# Patient Record
Sex: FEMALE | Race: WHITE | NOT HISPANIC OR LATINO | Employment: PART TIME | URBAN - METROPOLITAN AREA
[De-identification: names, ages, dates, MRNs, and addresses within clinical notes are randomized per-mention and may not be internally consistent; named-entity substitution may affect disease eponyms.]

---

## 2023-03-03 ENCOUNTER — OFFICE VISIT (OUTPATIENT)
Dept: PODIATRY | Facility: CLINIC | Age: 63
End: 2023-03-03

## 2023-03-03 VITALS
SYSTOLIC BLOOD PRESSURE: 110 MMHG | BODY MASS INDEX: 27.88 KG/M2 | DIASTOLIC BLOOD PRESSURE: 76 MMHG | HEIGHT: 60 IN | WEIGHT: 142 LBS

## 2023-03-03 DIAGNOSIS — M19.072 OSTEOARTHRITIS OF LEFT ANKLE OR FOOT: ICD-10-CM

## 2023-03-03 DIAGNOSIS — M79.672 PAIN IN LEFT FOOT: ICD-10-CM

## 2023-03-03 DIAGNOSIS — Q82.8 POROKERATOSIS: Primary | ICD-10-CM

## 2023-03-03 RX ORDER — DICLOFENAC SODIUM 75 MG/1
TABLET, DELAYED RELEASE ORAL
COMMUNITY
Start: 2023-02-08

## 2023-03-03 RX ORDER — METOPROLOL SUCCINATE 50 MG/1
50 TABLET, EXTENDED RELEASE ORAL DAILY
COMMUNITY

## 2023-03-03 RX ORDER — HYDROCODONE BITARTRATE AND ACETAMINOPHEN 5; 325 MG/1; MG/1
1 TABLET ORAL EVERY 6 HOURS PRN
COMMUNITY

## 2023-03-07 NOTE — PROGRESS NOTES
Assessment/Plan:    Porokeratosis  Recommend padding as needed, and paring of benign lesion when it gets thick enough  Pain in left foot  Osteoarthritis of left ankle or foot  Continue with current regimen of Voltaren 75 mg twice a day  Continue with Vicodin at night only if needed when pain gets severe  Continue with current orthotics/arch supports to help limit stress through the midfoot where her severe arthritis is localized  Call if any increased pain/discomfort are noted  Follow-up as needed  Other orders  -           Subjective:      Patient ID: Verena Main is a 57 y o  female  47-year-old white female presents with extremely painful lump on left foot  Patient reports this has previously given her trouble but she was able to pick the lesion out  Secondary concern of continued chronic pain from her left midfoot  Reports she continues to take Voltaren 75 mg a day for the arthritis  The following portions of the patient's history were reviewed and updated as appropriate: allergies, current medications, past family history, past medical history, past social history, past surgical history and problem list     Review of Systems   Constitutional: Negative for chills and fever  HENT: Negative for ear pain and sore throat  Eyes: Negative for pain and visual disturbance  Respiratory: Negative for cough and shortness of breath  Cardiovascular: Negative for chest pain and palpitations  Gastrointestinal: Negative for abdominal pain and vomiting  Genitourinary: Negative for dysuria and hematuria  Musculoskeletal: Negative for arthralgias and back pain  Pain left midfoot   Skin: Negative for color change and rash  Nucleated painful lesion with central keratin/core consistent with porokeratosis   Neurological: Negative for seizures and syncope  All other systems reviewed and are negative          Objective:      /76   Ht 5' (1 524 m)   Wt 64 4 kg (142 lb)   BMI 27 73 kg/m²          Physical Exam  Constitutional:       Appearance: Normal appearance  HENT:      Head: Normocephalic  Right Ear: Tympanic membrane normal       Left Ear: Tympanic membrane normal       Nose: No congestion  Mouth/Throat:      Mouth: Mucous membranes are moist       Pharynx: No oropharyngeal exudate or posterior oropharyngeal erythema  Eyes:      Extraocular Movements: Extraocular movements intact  Conjunctiva/sclera: Conjunctivae normal       Pupils: Pupils are equal, round, and reactive to light  Cardiovascular:      Rate and Rhythm: Normal rate and regular rhythm  Pulses:           Dorsalis pedis pulses are 1+ on the right side and 1+ on the left side  Posterior tibial pulses are 1+ on the left side  Pulmonary:      Effort: Pulmonary effort is normal    Abdominal:      Palpations: Abdomen is soft  Musculoskeletal:         General: Swelling and tenderness (Pain localized to the fourth/fifth metatarsal cuboid joint ) present  Left foot: Decreased range of motion  Comments: Generalized swelling through left midfoot with depressed medial longitudinal arch and diminished range of motion  Feet:      Right foot:      Protective Sensation: 10 sites tested  9 sites sensed  Skin integrity: Dry skin present  Left foot:      Protective Sensation: 10 sites tested  9 sites sensed  Skin integrity: Dry skin present  Skin:     General: Skin is warm and dry  Capillary Refill: Capillary refill takes 2 to 3 seconds  Coloration: Skin is not pale  Findings: No bruising, erythema, lesion or rash  Comments: Nucleated porokeratosis 0 2 cm² plantar midfoot, moderate pain with palpation   Neurological:      General: No focal deficit present  Mental Status: She is alert  Cranial Nerves: No cranial nerve deficit  Sensory: No sensory deficit  Motor: No weakness        Gait: Gait normal       Deep Tendon Reflexes: Reflexes normal    Psychiatric:         Mood and Affect: Mood normal          Behavior: Behavior normal          Judgment: Judgment normal

## 2023-04-07 ENCOUNTER — APPOINTMENT (OUTPATIENT)
Dept: RADIOLOGY | Facility: CLINIC | Age: 63
End: 2023-04-07

## 2023-04-07 ENCOUNTER — OFFICE VISIT (OUTPATIENT)
Dept: OBGYN CLINIC | Facility: CLINIC | Age: 63
End: 2023-04-07

## 2023-04-07 VITALS
SYSTOLIC BLOOD PRESSURE: 138 MMHG | WEIGHT: 142 LBS | HEIGHT: 60 IN | DIASTOLIC BLOOD PRESSURE: 88 MMHG | BODY MASS INDEX: 27.88 KG/M2

## 2023-04-07 DIAGNOSIS — M19.279 OSTEOARTHRITIS OF MIDFOOT DUE TO INFLAMMATORY ARTHRITIS: Primary | ICD-10-CM

## 2023-04-07 DIAGNOSIS — M25.572 PAIN, JOINT, ANKLE AND FOOT, LEFT: ICD-10-CM

## 2023-04-07 DIAGNOSIS — M19.90 OSTEOARTHRITIS OF MIDFOOT DUE TO INFLAMMATORY ARTHRITIS: Primary | ICD-10-CM

## 2023-04-07 NOTE — PROGRESS NOTES
CHELO Montgomery  Attending, Orthopaedic Surgery  [facility] [address] [facility]      ORTHOPAEDIC FOOT AND ANKLE CLINIC VISIT     Assessment:     Encounter Diagnoses   Name Primary? 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 Yes            Plan:   · The patient verbalized understanding of exam findings and treatment plan  We engaged in the shared decision-making process and treatment options were discussed at length with the patient  Surgical and conservative management discussed today along with risks and benefits  · She has severe arthritis in her midfoot (2-5th TMT joints and naviculocuneiform joints) with collapse of her arch  · Surgery for this would be significant  It would require a major reconstructive fusion  She wishes to hold off on this for now  · Carbon fiber foot plate  · Injections under fluoroscopy   · Supportive shoes  Return in about 3 months (around 7/7/2023)  May require TMT and NC joint arthrodesis      History of Present Illness:   Chief Complaint:   Chief Complaint   Patient presents with   • Left Ankle - Pain     Quirino Augustine is a 57 y o  female who is being seen for left foot pain  Patient states she complains of a collapsing arch that started about 5 years ago  She has been seen by Dr Nohemy Mendes in the past for previous management of her foot pain and plantar keratosis  Pain is localized at medial ankle and plantar aspect with minimal radiating and described as sharp and severe  Patient denies numbness, tingling or radicular pain  Denies history of neuropathy  Patient does not smoke, does not have diabetes and does not take blood thinners  Patient denies family history of anesthesia complications and has not had any complications with anesthesia       Pain/symptom timing:  Worse during the day when active  Pain/symptom context:  Worse with activites and work  Pain/symptom modifying factors:  Rest makes better, activities make worse  Pain/symptom associated signs/symptoms: none    Prior treatment   · NSAIDsYes   · Injections No   · Bracing/Orthotics No    · Physical Therapy No     Orthopedic Surgical History:   See below    Past Medical, Surgical and Social History:  Past Medical History:  has a past medical history of Celiac disease  Problem List: does not have a problem list on file  Past Surgical History:  has a past surgical history that includes Knee surgery (Right, 2015) and Knee Arthroplasty (Left, 2014)  Family History: family history includes Diabetes in her father; Heart attack in her father; Hypertension in her father and mother; Throat cancer in her mother  Social History:  reports that she has never smoked  She has never used smokeless tobacco  No history on file for alcohol use and drug use  Current Medications: has a current medication list which includes the following prescription(s): diclofenac, hydrocodone-acetaminophen, and metoprolol succinate  Allergies: is allergic to acyclovir, demerol [meperidine], percocet [oxycodone-acetaminophen], and tetracycline  Review of Systems:  General- denies fever/chills  HEENT- denies hearing loss or sore throat  Eyes- denies eye pain or visual disturbances, denies red eyes  Respiratory- denies cough or SOB  Cardio- denies chest pain or palpitations  GI- denies abdominal pain  Endocrine- denies urinary frequency  Urinary- denies pain with urination  Musculoskeletal- Negative except noted above  Skin- denies rashes or wounds  Neurological- denies dizziness or headache  Psychiatric- denies anxiety or difficulty concentrating    Physical Exam:   /88 (BP Location: Left arm, Patient Position: Sitting, Cuff Size: Adult)   Ht 5' (1 524 m)   Wt 64 4 kg (142 lb)   BMI 27 73 kg/m²   General/Constitutional: No apparent distress: well-nourished and well developed    Eyes: normal ocular motion  Cardio: RRR, Normal S1S2, No m/r/g  Lymphatic: No appreciable lymphadenopathy  Respiratory: Non-labored breathing, CTA b/l no w/c/r  Vascular: No edema, swelling or tenderness, except as noted in detailed exam   Integumentary: No impressive skin lesions present, except as noted in detailed exam   Neuro: No ataxia or tremors noted  Psych: Normal mood and affect, oriented to person, place and time  Appropriate affect  Musculoskeletal: Normal, except as noted in detailed exam and in HPI  Examination    Left    Gait Normal   Musculoskeletal Tender to palpation at medial ankle    Skin Normal       Nails Normal    Range of Motion  15 degrees dorsiflexion, 30 degrees plantarflexion  Subtalar motion: normal    Stability Stable    Muscle Strength 5/5 tibialis anterior  5/5 gastrocnemius-soleus  5/5 posterior tibialis  5/5 peroneal/eversion strength  5/5 EHL  5/5 FHL    Neurologic Normal    Sensation Intact to light touch throughout sural, saphenous, superficial peroneal, deep peroneal and medial/lateral plantar nerve distributions  [place] 5 07 filament (10g) testing  deferred  Cardiovascular Brisk capillary refill < 2 seconds,intact DP and PT pulses    Special Tests None      Imaging Studies:   3 views of the left foot were taken, reviewed and interpreted independently that demonstrate 2-5th TMT joint arthritis, NC joint arthritis  Reviewed by me personally  Donney Skeens Lachman, MD  Foot & Ankle Surgery   Department of [address] [place] [address]      I personally performed the service  Donney Skeens Lachman, MD

## 2023-04-07 NOTE — PATIENT INSTRUCTIONS
"You have 2nd-4th Tarsometatarsal joint arthritis as well as naviculocuneiform in your left foot  This is a wearing away of the cartilage in your foot leading to bone against bone which causes inflammation which causes pain  The nonoperative treatment for this involves supportive shoes, a carbon fiber foot plate and injections under fluoroscopic guidance  You may schedule your injection with our musculoskeletal radiology team at your earliest convenience  Go to [employer] and type in \"Full length carbon fiber foot plate  \" Pick an item that looks like the one below  Make sure it is the appropriate size for your foot (match your shoe size)  This insert goes under the orthotic in your shoe to stiffen the shoe  If the injections and carbon fiber foot plate are not effective, you will be a candidate for a midfoot fusion surgery  Midfoot Fusion     What is the midfoot? The midfoot refers to the bones and joints that make up the arch and connect the forefoot (which includes the bones of the toes) to the hindfoot (which includes the ankle bone and the heel bone)  What is a midfoot fusion? Midfoot fusion is a procedure in which the separate bones that make up the arch of the foot are fused into a single mass of bone  Fusion is also referred to as arthrodesis  Fusion eliminates the normal motion that occurs between two bones  At left, this standing X-ray view of a right foot shows a gap between the first and second metatarsals (arrow)  At right, this side X-ray view of the same foot shows loss of alignment  In a healthy foot, the two yellow lines would overlap one another  Midfoot fusion can involve all of the midfoot joints  More commonly just one or a few of the joints are fused  The joints of the midfoot do not bend and move like your knee or elbow  They are designed to be relatively stiff to give your foot strength and support your body   Midfoot fusion does not generally produce much " noticeable loss of motion because there is fairly little motion to begin with  What are the goals of a midfoot fusion? The primary goal of midfoot fusion is to decrease pain and improve function  Eliminating the painful motion between arthritic joint surfaces and restoring the bones to their normal positions achieves this  Other goals include the correction of deformity and the return of stability to the arch of the foot  A successful midfoot fusion can achieve these goals and restore more normal walking ability  What signs indicate a midfoot fusion may be needed? The most common reason for midfoot fusion is painful arthritis in the midfoot joints that has not improved with nonsurgical treatment  Other common reasons to do a midfoot fusion include too much motion of one or more of the midfoot joints or deformity of the midfoot  Examples of conditions that may result in midfoot deformity include severe bunions and flatfoot deformity  Midfoot fusion is also indicated for certain acute fractures and joint displacement involving the midfoot  When should I avoid surgery? Midfoot fusion should not be performed if there is active infection or if the patient’s health is poor enough that the risk of surgery is too high  Conditions such as uncontrolled diabetes and blood flow problems may make a patient a poor candidate for surgery  Other reasons to not perform midfoot fusion include osteoporosis and poor skin quality  Smoking significantly increases the risk that bones will not fuse  General Details of Procedure  Successful midfoot fusion depends on complete removal of all joint surfaces (cartilage) and stable fixation of the joints being fused  Residual cartilage can prevent the bones from fusing together  Failure to achieve adequate stability may allow too           Post-surgery X-rays showing correction   The arrow shows that the gap has been eliminated, and a single line can be drawn through the middle of the foot bones, which means the foot is now in the proper position  Screws and plates were used for the repair  much motion for fusion to occur  Specific Techniques  Midfoot fusion is generally accomplished using one or two incisions on the top of the foot  The length of the incision and how many incisions are necessary is determined by the number of joints to be fused  Careful attention is paid to protecting tendons and nerves  Stability is achieved during midfoot fusion using metal implants such as screws and plates  These are designed to immobilize the joints and allow for the formation of bone across the joint space  This process may involve the addition of bone graft material to fill any gaps that might exist between the bones after the cartilage has been removed  This bone graft material may be taken from another location in the patient’s body (autograft)  It may also come from donated bone (allograft) or from a synthetic material  A combination of these materials may be used  What happens after surgery? After surgery a period of protection and immobilization is required for successful fusion to occur  A cast is typically placed for the first six to 10 weeks  Weightbearing is not allowed on the affected foot for eight to 12 weeks after surgery  X-rays are usually obtained every four weeks to assess progress of the fusion  Gradually increased weightbearing is allowed as healing progresses  Initial weightbearing is protected in a prefabricated boot with gradual transition to supportive shoes  Physical therapy may be prescribed on a case-by-case basis to help the patient’s walking and balance  Potential Complications  There are complications that relate to surgery in general  These include risks associated with anesthesia, infection, damage to nerves and blood vessels, and bleeding or blood clots       A major potential complication after midfoot fusion is failure of the bones to fuse (nonunion)  Other complications can include over-correction or under-correction of deformity (malunion)  There can be problems with wound healing  Prominent plates and screws can be painful and may require removal of the hardware  Injury to nerves on the top of the foot can occur  Smoking is one of the leading risks for nonunion  Premature weightbearing can also result in failure of the bones to fuse  Frequently Asked Questions  How much motion in my foot will I lose after midfoot fusion? Motion of the midfoot joints is normally somewhat limited  Loss of that motion after fusion surgery tends to be well tolerated by patients  The more mobile joints of the ankle, hindfoot and forefoot are unaffected by midfoot fusion and thus continue to provide motion to the foot  Will I set off an airport metal detector after midfoot fusion? The strength of the metal detector and the amount of metal implants used determine whether hardware from a midfoot fusion will be detected  It is uncommon for the metal implants to be detectable by airport screening methods  How will I get around after surgery before I am allowed to put any weight on the foot? A combination of devices can be used, including crutches, walkers, knee-rollers, scooters and wheelchairs  Physical therapy is also used to help assess patient needs and improve mobility and safety  Certain patients may benefit from the assistance provided by a skilled nursing facility or post-operative rehabilitation unit  Will the plates and screws have to be removed after midfoot fusion? Metal implants used for midfoot fusion are not routinely removed  Hardware may need to be removed if there is a failure of the fusion or if infection develops  Painful hardware can be removed once the fusion is healed        [employer], [employer], [employer] ([employer] or [employer] is what I recommend)are good brands but I recommend going to a dedicate shoe store (not [employer] or [employer] ) At these types of stores, they have experts that can fit you for shoes appropriate for your foot problem  Shoe choice is essential to solving/improving most types of foot pain  Even after a surgery, good shoes are necessary to keep the foot as comfortable as possible  [employer]  [address] [facility] [address], [address] [address]  [facility], [address]  [phone]    [employer] [address] [facility], [address] [place], [address]  [address] [facility]  [address], [address] [facility]    [employer]  [address] [facility], [address] [facility]  [phone]    [address], [facility], [place] [address]  [phone]    [employer]  [address] [facility] [place], [place], [address]    [address],[place] [address]  [address] [facility], [address]  [address] [facility], [address] [facility], [address] [place] [address]   [phone]

## 2023-04-07 NOTE — LETTER
April 7, 2023     Katherine Stephenson, [address] [facility] [address] [facility]    Patient: Brooklynn Solis   YOB: 1960   Date of Visit: 4/7/2023       Dear Dr Singh Res:    Thank you for referring Brooklynn Solis to me for evaluation  Below are my notes for this consultation  If you have questions, please do not hesitate to call me  I look forward to following your patient along with you  Sincerely,        Ralph Cha MD        CC: No Recipients  Ralph Cha MD  4/7/2023 10:34 AM  Signed        CHELO Lopes  Attending, Orthopaedic Surgery  Foot and [address] [facility] [address] [facility]      ORTHOPAEDIC FOOT AND ANKLE CLINIC VISIT     Assessment:     Encounter Diagnoses   Name Primary? 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 Yes            Plan:   · The patient verbalized understanding of exam findings and treatment plan  We engaged in the shared decision-making process and treatment options were discussed at length with the patient  Surgical and conservative management discussed today along with risks and benefits  · She has severe arthritis in her midfoot (2-5th TMT joints and naviculocuneiform joints) with collapse of her arch  · Surgery for this would be significant  It would require a major reconstructive fusion  She wishes to hold off on this for now  · Carbon fiber foot plate  · Injections under fluoroscopy   · Supportive shoes  Return in about 3 months (around 7/7/2023)  May require TMT and NC joint arthrodesis      History of Present Illness:   Chief Complaint:   Chief Complaint   Patient presents with   • Left Ankle - Pain     Brooklynn Solis is a 57 y o  female who is being seen for left foot pain  Patient states she complains of a collapsing arch that started about 5 years ago  She has been seen by Dr Marielena Llanos in the past for previous management of her foot pain and plantar keratosis    Pain is localized at medial ankle and plantar aspect with minimal radiating and described as sharp and severe  Patient denies numbness, tingling or radicular pain  Denies history of neuropathy  Patient does not smoke, does not have diabetes and does not take blood thinners  Patient denies family history of anesthesia complications and has not had any complications with anesthesia  Pain/symptom timing:  Worse during the day when active  Pain/symptom context:  Worse with activites and work  Pain/symptom modifying factors:  Rest makes better, activities make worse  Pain/symptom associated signs/symptoms: none    Prior treatment   · NSAIDsYes   · Injections No   · Bracing/Orthotics No    · Physical Therapy No     Orthopedic Surgical History:   See below    Past Medical, Surgical and Social History:  Past Medical History:  has a past medical history of Celiac disease  Problem List: does not have a problem list on file  Past Surgical History:  has a past surgical history that includes Knee surgery (Right, 2015) and Knee Arthroplasty (Left, 2014)  Family History: family history includes Diabetes in her father; Heart attack in her father; Hypertension in her father and mother; Throat cancer in her mother  Social History:  reports that she has never smoked  She has never used smokeless tobacco  No history on file for alcohol use and drug use  Current Medications: has a current medication list which includes the following prescription(s): diclofenac, hydrocodone-acetaminophen, and metoprolol succinate  Allergies: is allergic to acyclovir, demerol [meperidine], percocet [oxycodone-acetaminophen], and tetracycline      Review of Systems:  General- denies fever/chills  HEENT- denies hearing loss or sore throat  Eyes- denies eye pain or visual disturbances, denies red eyes  Respiratory- denies cough or SOB  Cardio- denies chest pain or palpitations  GI- denies abdominal pain  Endocrine- denies urinary frequency  Urinary- denies pain with urination  Musculoskeletal- Negative except noted above  Skin- denies rashes or wounds  Neurological- denies dizziness or headache  Psychiatric- denies anxiety or difficulty concentrating    Physical Exam:   /88 (BP Location: Left arm, Patient Position: Sitting, Cuff Size: Adult)   Ht 5' (1 524 m)   Wt 64 4 kg (142 lb)   BMI 27 73 kg/m²   General/Constitutional: No apparent distress: well-nourished and well developed  Eyes: normal ocular motion  Cardio: RRR, Normal S1S2, No m/r/g  Lymphatic: No appreciable lymphadenopathy  Respiratory: Non-labored breathing, CTA b/l no w/c/r  Vascular: No edema, swelling or tenderness, except as noted in detailed exam   Integumentary: No impressive skin lesions present, except as noted in detailed exam   Neuro: No ataxia or tremors noted  Psych: Normal mood and affect, oriented to person, place and time  Appropriate affect  Musculoskeletal: Normal, except as noted in detailed exam and in HPI  Examination    Left    Gait Normal   Musculoskeletal Tender to palpation at medial ankle    Skin Normal       Nails Normal    Range of Motion  15 degrees dorsiflexion, 30 degrees plantarflexion  Subtalar motion: normal    Stability Stable    Muscle Strength 5/5 tibialis anterior  5/5 gastrocnemius-soleus  5/5 posterior tibialis  5/5 peroneal/eversion strength  5/5 EHL  5/5 FHL    Neurologic Normal    Sensation Intact to light touch throughout sural, saphenous, superficial peroneal, deep peroneal and medial/lateral plantar nerve distributions  [place]ango 5 07 filament (10g) testing  deferred  Cardiovascular Brisk capillary refill < 2 seconds,intact DP and PT pulses    Special Tests None      Imaging Studies:   3 views of the left foot were taken, reviewed and interpreted independently that demonstrate 2-5th TMT joint arthritis, NC joint arthritis  Reviewed by me personally  Reuben Running Lachman, MD  Foot & Ankle Surgery   Department of Orthopaedic [address] [facility]      I personally performed the service  Julianne Ray Lachman, MD

## 2023-04-25 ENCOUNTER — HOSPITAL ENCOUNTER (OUTPATIENT)
Dept: RADIOLOGY | Facility: HOSPITAL | Age: 63
Discharge: HOME | End: 2023-04-25
Attending: ORTHOPAEDIC SURGERY

## 2023-04-25 DIAGNOSIS — M19.90 OSTEOARTHRITIS OF MIDFOOT DUE TO INFLAMMATORY ARTHRITIS: ICD-10-CM

## 2023-04-25 DIAGNOSIS — M19.279 OSTEOARTHRITIS OF MIDFOOT DUE TO INFLAMMATORY ARTHRITIS: ICD-10-CM

## 2023-04-25 RX ORDER — BUPIVACAINE HYDROCHLORIDE 2.5 MG/ML
5 INJECTION, SOLUTION EPIDURAL; INFILTRATION; INTRACAUDAL
Status: COMPLETED | OUTPATIENT
Start: 2023-04-25 | End: 2023-04-25

## 2023-04-25 RX ORDER — LIDOCAINE HYDROCHLORIDE 10 MG/ML
5 INJECTION, SOLUTION EPIDURAL; INFILTRATION; INTRACAUDAL; PERINEURAL
Status: COMPLETED | OUTPATIENT
Start: 2023-04-25 | End: 2023-04-25

## 2023-04-25 RX ORDER — BETAMETHASONE SODIUM PHOSPHATE AND BETAMETHASONE ACETATE 3; 3 MG/ML; MG/ML
18 INJECTION, SUSPENSION INTRA-ARTICULAR; INTRALESIONAL; INTRAMUSCULAR; SOFT TISSUE ONCE
Status: COMPLETED | OUTPATIENT
Start: 2023-04-25 | End: 2023-04-25

## 2023-04-25 RX ADMIN — IOHEXOL 1 ML: 300 INJECTION, SOLUTION INTRAVENOUS at 15:42

## 2023-04-25 RX ADMIN — LIDOCAINE HYDROCHLORIDE 3 ML: 10 INJECTION, SOLUTION EPIDURAL; INFILTRATION; INTRACAUDAL; PERINEURAL at 15:43

## 2023-04-25 RX ADMIN — BUPIVACAINE HYDROCHLORIDE 0.5 ML: 2.5 INJECTION, SOLUTION EPIDURAL; INFILTRATION; INTRACAUDAL; PERINEURAL at 15:43

## 2023-04-25 RX ADMIN — BETAMETHASONE SODIUM PHOSPHATE AND BETAMETHASONE ACETATE 6 MG: 3; 3 INJECTION, SUSPENSION INTRA-ARTICULAR; INTRALESIONAL; INTRAMUSCULAR at 15:42

## 2023-06-23 ENCOUNTER — OFFICE VISIT (OUTPATIENT)
Dept: PODIATRY | Facility: CLINIC | Age: 63
End: 2023-06-23
Payer: COMMERCIAL

## 2023-06-23 VITALS
DIASTOLIC BLOOD PRESSURE: 78 MMHG | SYSTOLIC BLOOD PRESSURE: 123 MMHG | HEART RATE: 64 BPM | HEIGHT: 60 IN | BODY MASS INDEX: 27.88 KG/M2 | WEIGHT: 142 LBS

## 2023-06-23 DIAGNOSIS — M19.072 OSTEOARTHRITIS OF LEFT ANKLE OR FOOT: Primary | ICD-10-CM

## 2023-06-23 DIAGNOSIS — M79.672 PAIN IN LEFT FOOT: ICD-10-CM

## 2023-06-23 DIAGNOSIS — Q82.8 POROKERATOSIS: ICD-10-CM

## 2023-06-23 PROCEDURE — 99213 OFFICE O/P EST LOW 20 MIN: CPT | Performed by: PODIATRIST

## 2023-06-23 RX ORDER — HYDROCODONE BITARTRATE AND ACETAMINOPHEN 5; 325 MG/1; MG/1
1 TABLET ORAL EVERY 8 HOURS PRN
Qty: 90 TABLET | Refills: 0 | Status: SHIPPED | OUTPATIENT
Start: 2023-06-23 | End: 2023-07-23

## 2023-06-23 NOTE — PROGRESS NOTES
Assessment/Plan:   Discussed with patient possibility of having an additional corticosteroid injection at next visit if things do not improve  4/7/2023 radiographs reviewed which show severe degenerative arthritis throughout the metatarsal cuneiform joints and the cuneiform navicular joints  May also consider referral to pain management and/or fabrication of new orthotics  Follow-up 6 weeks   Recommend patient refrain from using Norco unless absolutely necessary in the evening  Diagnoses and all orders for this visit:    Osteoarthritis of left ankle or foot  -     HYDROcodone-acetaminophen (NORCO) 5-325 mg per tablet; Take 1 tablet by mouth every 8 (eight) hours as needed for pain Max Daily Amount: 3 tablets    Porokeratosis    Pain in left foot  -     HYDROcodone-acetaminophen (NORCO) 5-325 mg per tablet; Take 1 tablet by mouth every 8 (eight) hours as needed for pain Max Daily Amount: 3 tablets          Subjective:     Patient ID: Rubi Armstrong is a 57 y o  female  61-year-old female presents concerned with continued breakthrough pain of her left midfoot  Patient reports that she continues to take Voltaren twice a day does seem to help throughout the day but at night when it is time to sleep she has aching pain that does not let up  Pain is localized to the same left midfoot area  Chronic lesion on the plantar aspect of the left foot remains unchanged  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       Review of Systems   Constitutional: Negative  HENT: Negative  Eyes: Negative  Respiratory: Negative  Cardiovascular: Negative  Gastrointestinal: Negative  Endocrine: Negative  Genitourinary: Negative  Musculoskeletal: Positive for arthralgias (Left midfoot as noted previously) and gait problem  Skin:        Porokeratotic lesion left foot unchanged   Allergic/Immunologic: Negative  Hematological: Negative  Psychiatric/Behavioral: Negative  Objective:     Physical Exam  Constitutional:       Appearance: Normal appearance  HENT:      Head: Normocephalic  Right Ear: External ear normal       Left Ear: External ear normal    Eyes:      Pupils: Pupils are equal, round, and reactive to light  Cardiovascular:      Rate and Rhythm: Normal rate  Pulses: Normal pulses  Pulmonary:      Effort: Pulmonary effort is normal    Musculoskeletal:         General: Swelling and tenderness present  Cervical back: Normal range of motion  Left foot: Decreased range of motion  Comments: Moderate pain with palpation left midfoot 4/5 met cuboid joint region with localized edema noted  Decreased range of motion left midfoot   Feet:      Right foot:      Protective Sensation: 10 sites tested  10 sites sensed  Skin integrity: Skin integrity normal       Left foot:      Protective Sensation: 10 sites tested  10 sites sensed  Skin integrity: Skin integrity normal    Skin:     General: Skin is warm and dry  Capillary Refill: Capillary refill takes 2 to 3 seconds  Comments: Plantar lateral midfoot porokeratotic lesion noted 0 5 cm² in size  Somewhat diminished in thickness and discomfort compared to last evaluation  Neurological:      General: No focal deficit present  Mental Status: She is alert     Psychiatric:         Mood and Affect: Mood normal

## 2023-07-07 ENCOUNTER — OFFICE VISIT (OUTPATIENT)
Dept: OBGYN CLINIC | Facility: CLINIC | Age: 63
End: 2023-07-07
Payer: COMMERCIAL

## 2023-07-07 VITALS — HEIGHT: 60 IN | BODY MASS INDEX: 27.88 KG/M2 | WEIGHT: 142 LBS

## 2023-07-07 DIAGNOSIS — M19.279 OSTEOARTHRITIS OF MIDFOOT DUE TO INFLAMMATORY ARTHRITIS: Primary | ICD-10-CM

## 2023-07-07 DIAGNOSIS — M19.90 OSTEOARTHRITIS OF MIDFOOT DUE TO INFLAMMATORY ARTHRITIS: Primary | ICD-10-CM

## 2023-07-07 PROCEDURE — 99213 OFFICE O/P EST LOW 20 MIN: CPT | Performed by: ORTHOPAEDIC SURGERY

## 2023-07-07 NOTE — PROGRESS NOTES
Polly Gregg M.D. Attending, Orthopaedic Surgery  Foot and [address] [facility]      ORTHOPAEDIC FOOT AND ANKLE CLINIC VISIT     Assessment:     Encounter Diagnosis   Name Primary? 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 We engaged in the shared decision-making process and treatment options were discussed at length with the patient. Surgical and conservative management discussed today along with risks and benefits. Patient is a f/u for Left 2-5th TMT and naviculocuneiform joint arthritis  She got ~2 months of relief from her fl guided inj on 4/25  Continue stiff sole supportive shoe wear   Carbon fiber foot plate   FL guided inj ordered  She is a candidate for a midfoot arthrodesis. She would like to hold for now. Return if symptoms worsen or fail to improve. History of Present Illness:   Chief Complaint:   Chief Complaint   Patient presents with   • Left Foot - Follow-up     Sowmya Gold is a 57 y.o. female who is being seen in follow-up for left midfoot pain. When we last saw she we recommended fl guided inj. Pain has  improved. Residual pain is localized at dorsal midfoot with minimal radiating and described as sharp and severe. Pain/symptom timing:  Worse during the day when active  Pain/symptom context:  Worse with activites and work  Pain/symptom modifying factors:  Rest makes better, activities make worse  Pain/symptom associated signs/symptoms: none    Prior treatment   · NSAIDsYes   · Injections Yes   · Bracing/Orthotics No    · Physical Therapy No     Orthopedic Surgical History:   See below    Past Medical, Surgical and Social History:  Past Medical History:  has a past medical history of Celiac disease. Problem List: does not have a problem list on file.   Past Surgical History:  has a past surgical history that includes Knee surgery (Right, 2015); Knee Arthroplasty (Left, 2014); and FL guided needle plac bx/asp/inj (4/25/2023). Family History: family history includes Diabetes in her father; Heart attack in her father; Hypertension in her father and mother; Throat cancer in her mother. Social History:  reports that she has never smoked. She has never used smokeless tobacco. No history on file for alcohol use and drug use. Current Medications: has a current medication list which includes the following prescription(s): diclofenac, hydrocodone-acetaminophen, and metoprolol succinate. Allergies: is allergic to acyclovir, demerol [meperidine], percocet [oxycodone-acetaminophen], and tetracycline. Review of Systems:  General- denies fever/chills  HEENT- denies hearing loss or sore throat  Eyes- denies eye pain or visual disturbances, denies red eyes  Respiratory- denies cough or SOB  Cardio- denies chest pain or palpitations  GI- denies abdominal pain  Endocrine- denies urinary frequency  Urinary- denies pain with urination  Musculoskeletal- Negative except noted above  Skin- denies rashes or wounds  Neurological- denies dizziness or headache  Psychiatric- denies anxiety or difficulty concentrating    Physical Exam:   Ht 5' (1.524 m)   Wt 64.4 kg (142 lb)   BMI 27.73 kg/m²   General/Constitutional: No apparent distress: well-nourished and well developed. Eyes: normal ocular motion  Lymphatic: No appreciable lymphadenopathy  Respiratory: Non-labored breathing  Vascular: No edema, swelling or tenderness, except as noted in detailed exam.  Integumentary: No impressive skin lesions present, except as noted in detailed exam.  Neuro: No ataxia or tremors noted  Psych: Normal mood and affect, oriented to person, place and time. Appropriate affect. Musculoskeletal: Normal, except as noted in detailed exam and in HPI.     Examination    Left    Gait Normal   Musculoskeletal Tender to palpation at dorsal midfoot    Skin Normal.      Nails Normal    Range of Motion  20 degrees dorsiflexion, 30 degrees plantarflexion  Subtalar motion: normal    Stability Stable    Muscle Strength 5/5 tibialis anterior  5/5 gastrocnemius-soleus  5/5 posterior tibialis  5/5 peroneal/eversion strength  5/5 EHL  5/5 FHL    Neurologic Normal    Sensation  Intact to light touch throughout sural, saphenous, superficial peroneal, deep peroneal and medial/lateral plantar nerve distributions. [place]ango 5.07 filament (10g) testing  deferred. Cardiovascular Brisk capillary refill < 2 seconds,intact DP and PT pulses    Special Tests None      Imaging Studies:   No new imaging    Scribe Attestation    I,:  Tyson Cornejo PA-C am acting as a scribe while in the presence of the attending physician.:       I,:  Alissa Lyons MD personally performed the services described in this documentation    as scribed in my presence.:                 Og Donovan. Lachman, MD  Foot & Ankle Surgery   Department of [address] [place] [address]      I personally performed the service. Og Donovan.  Lachman, MD

## 2023-07-10 ENCOUNTER — TELEPHONE (OUTPATIENT)
Dept: OBGYN CLINIC | Facility: MEDICAL CENTER | Age: 63
End: 2023-07-10

## 2023-07-10 NOTE — TELEPHONE ENCOUNTER
Called patient relayed message to call central scheduling to schedule FL Injection with Radiology provided number.

## 2023-07-10 NOTE — TELEPHONE ENCOUNTER
Caller: Patient    Doctor: Lachman    Reason for call:     Patient is calling to schedule her injection for left foot (FL GUIDE NEEDLE PLACE WO ARTH) last one was  April 25th and she would like a call to schedule another injection.     Call back#: [phone]

## 2023-07-10 NOTE — TELEPHONE ENCOUNTER
She called the wrong place. We order these  (and her order from Fridays appointment is in the chart.) and they are performed by [facility] radiology. A different department. She should call and schedule with our [facility] radiology team.  The same team that performed her injection 4/25. She is not a candidate for another injection until 7/25 at the earliest as we discussed in her visit. There must be a minimum of 3 months between injections. We prefer even longer if possible.

## 2023-07-17 NOTE — NURSING NOTE
Call placed to patient to discuss upcoming appointment at [address] [place] [address] radiology department and complete consultation with patient. Patient is having left foot CSI  utilizing fluoroscopy guidance. Reviewed  patient's allergies, no current anticoagulant medication present per patient , patient has had procedure in the past, no questions when asked if any questions or concerns. Reminded patient of location and time expected for procedure, Patient expressed understanding by verbalizing and repeating instructions.

## 2023-08-08 ENCOUNTER — HOSPITAL ENCOUNTER (OUTPATIENT)
Dept: RADIOLOGY | Facility: HOSPITAL | Age: 63
Discharge: HOME | End: 2023-08-08
Payer: COMMERCIAL

## 2023-08-08 DIAGNOSIS — M19.90 OSTEOARTHRITIS OF MIDFOOT DUE TO INFLAMMATORY ARTHRITIS: ICD-10-CM

## 2023-08-08 DIAGNOSIS — M19.279 OSTEOARTHRITIS OF MIDFOOT DUE TO INFLAMMATORY ARTHRITIS: ICD-10-CM

## 2023-08-08 PROCEDURE — 77002 NEEDLE LOCALIZATION BY XRAY: CPT

## 2023-08-08 RX ORDER — BETAMETHASONE SODIUM PHOSPHATE AND BETAMETHASONE ACETATE 3; 3 MG/ML; MG/ML
12 INJECTION, SUSPENSION INTRA-ARTICULAR; INTRALESIONAL; INTRAMUSCULAR; SOFT TISSUE ONCE
Status: COMPLETED | OUTPATIENT
Start: 2023-08-08 | End: 2023-08-08

## 2023-08-08 RX ORDER — LIDOCAINE HYDROCHLORIDE 10 MG/ML
5 INJECTION, SOLUTION EPIDURAL; INFILTRATION; INTRACAUDAL; PERINEURAL
Status: COMPLETED | OUTPATIENT
Start: 2023-08-08 | End: 2023-08-08

## 2023-08-08 RX ORDER — BUPIVACAINE HYDROCHLORIDE 2.5 MG/ML
10 INJECTION, SOLUTION EPIDURAL; INFILTRATION; INTRACAUDAL
Status: COMPLETED | OUTPATIENT
Start: 2023-08-08 | End: 2023-08-08

## 2023-08-08 RX ADMIN — IOHEXOL 0.5 ML: 300 INJECTION, SOLUTION INTRAVENOUS at 15:40

## 2023-08-08 RX ADMIN — BUPIVACAINE HYDROCHLORIDE 1 ML: 2.5 INJECTION, SOLUTION EPIDURAL; INFILTRATION; INTRACAUDAL; PERINEURAL at 15:42

## 2023-08-08 RX ADMIN — LIDOCAINE HYDROCHLORIDE 5 ML: 10 INJECTION, SOLUTION EPIDURAL; INFILTRATION; INTRACAUDAL; PERINEURAL at 15:42

## 2023-08-08 RX ADMIN — BETAMETHASONE SODIUM PHOSPHATE AND BETAMETHASONE ACETATE 6 MG: 3; 3 INJECTION, SUSPENSION INTRA-ARTICULAR; INTRALESIONAL; INTRAMUSCULAR at 15:41

## 2023-10-06 ENCOUNTER — TELEPHONE (OUTPATIENT)
Dept: PODIATRY | Facility: CLINIC | Age: 63
End: 2023-10-06

## 2023-10-06 ENCOUNTER — OFFICE VISIT (OUTPATIENT)
Dept: PODIATRY | Facility: CLINIC | Age: 63
End: 2023-10-06
Payer: COMMERCIAL

## 2023-10-06 VITALS
WEIGHT: 139 LBS | DIASTOLIC BLOOD PRESSURE: 78 MMHG | BODY MASS INDEX: 27.29 KG/M2 | SYSTOLIC BLOOD PRESSURE: 120 MMHG | HEIGHT: 60 IN

## 2023-10-06 DIAGNOSIS — M79.672 PAIN IN LEFT FOOT: ICD-10-CM

## 2023-10-06 DIAGNOSIS — M19.072 OSTEOARTHRITIS OF LEFT ANKLE OR FOOT: Primary | ICD-10-CM

## 2023-10-06 DIAGNOSIS — Q82.8 POROKERATOSIS: ICD-10-CM

## 2023-10-06 PROCEDURE — 99213 OFFICE O/P EST LOW 20 MIN: CPT | Performed by: PODIATRIST

## 2023-10-06 NOTE — TELEPHONE ENCOUNTER
Patient inuring about surgery in February. She is schedule to see you in December is this to early to discuss surgery or should it be scheduled in January. Please advise.

## 2023-10-08 NOTE — PROGRESS NOTES
Assessment/Plan:   If arthritic pain/discomfort increases patient will consider an injection with corticosteroid. Reports she is contemplating having a midfoot fusion if it gets really bad. Recommend patient consider possibly obtaining a new pair of custom molded orthotics which may afford her some better pain relief and which can have accommodations for the painful lesion incorporated. Painful hyperkeratotic lesion pared, recommend palliative care for this as needed. Patient can consider using PEG at home to keep lesion down. Follow-up in 3 months. Diagnoses and all orders for this visit:    Osteoarthritis of left ankle or foot    Porokeratosis    Pain in left foot          Subjective:     Patient ID: Jean Marie Garza is a 70 y.o. female. 86/2023: 40-year-old female seen today for follow-up reports her left midfoot arthritis seems to be doing okay and continues to take Voltaren twice a day. Has not been needing pain meds at night near as much. Secondary concern of painful lesion plantar aspect of her left foot laterally which seems to be giving her more trouble. 6/23/2023: 15-year-old female presents concerned with continued breakthrough pain of her left midfoot. Patient reports that she continues to take Voltaren twice a day does seem to help throughout the day but at night when it is time to sleep she has aching pain that does not let up. Pain is localized to the same left midfoot area. Chronic lesion on the plantar aspect of the left foot remains unchanged. 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      Review of Systems   Constitutional: Negative. HENT: Negative. Eyes: Negative. Respiratory: Negative. Cardiovascular: Negative. Gastrointestinal: Negative. Endocrine: Negative. Genitourinary: Negative. Musculoskeletal: Positive for arthralgias (Left midfoot).    Skin:        Plantar lateral left foot, painful lesion which appears to be getting larger   Allergic/Immunologic: Negative. Neurological: Negative. Hematological: Negative. Psychiatric/Behavioral: Negative. Objective:     Physical Exam  Constitutional:       Appearance: Normal appearance. HENT:      Head: Normocephalic. Right Ear: External ear normal.      Left Ear: External ear normal.   Eyes:      Pupils: Pupils are equal, round, and reactive to light. Cardiovascular:      Rate and Rhythm: Normal rate. Pulses: Normal pulses. Pulmonary:      Effort: Pulmonary effort is normal.   Musculoskeletal:         General: Swelling, tenderness and deformity present. Cervical back: Normal range of motion. Comments: Left midfoot mild localized edema with diminished range of motion secondary to arthritic changes. Less discomfort than previous experience with evaluation. Feet:      Right foot:      Protective Sensation: 10 sites tested. 10 sites sensed. Skin integrity: Skin integrity normal.      Left foot:      Protective Sensation: 10 sites tested. 10 sites sensed. Skin integrity: Skin integrity normal.   Skin:     General: Skin is warm and dry. Capillary Refill: Capillary refill takes 2 to 3 seconds. Comments: 0.5 cm² nucleated hyperkeratotic lesion plantar lateral left midfoot beneath fifth metatarsal/cuboid joint lesion is consistent with a porokeratosis. Moderate pain with palpation. Neurological:      General: No focal deficit present. Mental Status: She is alert.    Psychiatric:         Mood and Affect: Mood normal.

## 2024-01-05 ENCOUNTER — OFFICE VISIT (OUTPATIENT)
Dept: OBGYN CLINIC | Facility: CLINIC | Age: 64
End: 2024-01-05
Payer: COMMERCIAL

## 2024-01-05 ENCOUNTER — PREP FOR PROCEDURE (OUTPATIENT)
Dept: OBGYN CLINIC | Facility: CLINIC | Age: 64
End: 2024-01-05

## 2024-01-05 VITALS — WEIGHT: 139 LBS | BODY MASS INDEX: 27.29 KG/M2 | HEIGHT: 60 IN

## 2024-01-05 DIAGNOSIS — Z01.818 PREOP TESTING: ICD-10-CM

## 2024-01-05 DIAGNOSIS — M19.90 OSTEOARTHRITIS OF MIDFOOT DUE TO INFLAMMATORY ARTHRITIS: Primary | ICD-10-CM

## 2024-01-05 DIAGNOSIS — M19.279 OSTEOARTHRITIS OF MIDFOOT DUE TO INFLAMMATORY ARTHRITIS: Primary | ICD-10-CM

## 2024-01-05 PROCEDURE — 99214 OFFICE O/P EST MOD 30 MIN: CPT | Performed by: ORTHOPAEDIC SURGERY

## 2024-01-05 RX ORDER — CHLORHEXIDINE GLUCONATE 4 G/100ML
SOLUTION TOPICAL DAILY PRN
OUTPATIENT
Start: 2024-01-05

## 2024-01-05 RX ORDER — CHLORHEXIDINE GLUCONATE ORAL RINSE 1.2 MG/ML
15 SOLUTION DENTAL ONCE
OUTPATIENT
Start: 2024-01-05 | End: 2024-01-05

## 2024-01-05 NOTE — PATIENT INSTRUCTIONS
James R Lachman, M.D.  Attending, Orthopaedic Surgery  [facility]  [place] Office Phone: [phone] ? Fax: [phone]  [place] Office Phone: [phone] ? Fax:[phone]    : Regino Cisneros MA     Surgery Coordinators [place]: Yvette Hemphill, [phone]  Yuliya Centeno, [phone]  Surgery Coordinator [place]:  Ankitosmel Wm, [phone]                                                        Linaron Gutiérrez, [phone]    [email or web address]   PRE-OPERATIVE AND POST-OPERATIVE INSTRUCTIONS    General Information:  Your surgery is with Dr. Lachman.  Dates can change (although rare) depending on emergencies.  Typical post operative visits are at the following intervals:  3 weeks post surgery(except 1 week for bunions and wound monitoring), 6 weeks post surgery, 3 months post surgery, 6 months post surgery, and then on a yearly basis.  However, this may change based on Dr. Lachmans’ recommendation.  #1 post-operative rule for foot/ankle surgery:  ONCE YOU ARE OUT OF YOUR CAST AND/OR REMOVABLE BOOT, SWELLING MAY PERSIST FOR MANY MONTHS.  YOU MIGHT ALSO EXPERIENCE A BLUISH DISCOLORATION OF YOUR LEG.  THIS IS NORMAL AND PART OF THE USUAL POSTOPERATIVE EXPERIENCE.    SMOKING:  Smoking results in incomplete healing of fractures (broken bones) and joints that my have been fused.  Smoking and nicotine also prevents the growth of bone into ankle replacements and bone healing.  It also slows the healing of muscles and skin (soft tissue).  Therefore, please do not have surgery if you continue to smoke.  We reserve the right to cancel your surgery if we suspect that you are smoking.  DO NOT use nicorette gum or other patches.  Please find an alternative method to quit smoking before your surgery.    Pre-Operative Information:  Surgery date and preoperative visits:  If you have medical problems, such as an abnormal EKG, history of  BLOOD CLOT, ANEURYSM, and any other heart condition, please inform us so that we can get your medical clearance several weeks before the surgery.  Please bring any important medical information, such as an EKG, chest x-ray, or echocardiogram, with you to ensure that your surgery will not be delayed.  If needed, you will receive your preoperative appointments in the mail or by phone from our scheduling office.  The location of the preoperative appointment will be given to you also.  You may not eat after midnight the night before surgery.  If you do, your surgery will be cancelled.   You will receive a phone call from your surgery center the day before your surgery (if your surgery is on a Monday, you will get a call the Friday before).   If you do not hear from someone by 4pm the day before your surgery, please call the Surgical coordinator (number above) to notify us.  Start taking Vitamin D3 4000 units per day and Calcium 1200mg per day immediately. You will continue this until your 3 month post-op visit. These are over the counter and available at all pharmacies and supermarkets.  FOR THOSE HAVING SURGERY AT [facility]- IF YOU WILL NEED CRUTCHES OR A ROLLING WALKER AFTER SURGERY, ASK FOR A PRESCRIPTION FOR THIS FROM OUR OFFICE TODAY.  THIS CANNOT BE HANDLED THE DAY OF SURGERY AS [facility] DOES NOT STOCK THESE.    Because bacterial can often enter any defect in the skin, it is important to avoid any cuts before surgery.  Any breaks in the skin on the leg will often result in your surgery being postponed.  Please avoid going on a very long walk the day prior to surgery, or doing other activities that could lead to irritation of the skin, including yard work, extra athletic activity, or shaving.   This could result in surgery cancellation.  You MUST be fasting the day of your surgery.  Therefore, please do not consume any foot or beverage after midnight the night before surgery.  The morning  of surgery you may take your usual medications with a sip of water.  It is important not to take anti-inflammatory medication like Ibuprofen, Motrin, Naproxen (Aleve), or Aspirin 7-10 days before surgery because they will make you bleed more than usual.  Vitamin, E, Plavix and Coumadin also have the same effect.  Stop Aspirin and Vitamin E two weeks before surgery.  YOUR MEDICAL DOCTOR SHOULD TELL YOU WHEN TO STOP COUMADIN OR PLAVIX.  If your surgery involves any bone healing, please do not take anti-inflammatories for at least 6 weeks after surgery.  This can impede bone healing (ibuprofen, Aleve, Relafen, iodine).  Tylenol is fine to take.    PREOPERATIVE BATHING INSTRUCTIONS:    Before your surgery, bathe with Hibiclens (4% Chlorhexidene) as instructed below.  This skin cleanser will help reduce the bacteria on your skin before surgery.  To avoid irritating your eyes, do not apply Hibiclens above the level of your neck.  On the evening before AND the morning of surgery, bathe your entire body except the face and scalp, then rinse freely.  DO NOT apply to your face or scalp, as Hibiclens can irritate your eyes.  Purchasing information:   Hibiclens is available without a prescription at most retail pharmacies.     ADDITIONAL INSTRUCTIONS:  PATIENTS HAVING FOOT/ANKLE SURGERY     In preparation for your upcoming surgery, we kindly request and advise the following:  Notify our office if you are taking any of the following:  Coumadin (warfarin):  Persantine (dipyridamole); Pletal (cilostazol); Plavix (clopidogrel); Ticlid (ticlopidine); Agrylin (anagrelide); Aggrenox (dipyridamole and aspirin) or other blood thinners,.  In addition, stop taking Vitamin E and herbal supplements.  Do not schedule any elective dental work for at least 6 months after surgery.  If you had an ankle replacement, you will need to take antibiotics before any future dental procedures. Your dentist or our office can prescribe these for you.   1000mg of Amoxicillin 1 hour prior to any dental procedure is the recommended dosing.      THREE RULES:    After surgery you will most likely be given the instructions “KEEP YOUR TOES ABOVE YOUR NOSE.”  This means that you MUST have your feet elevated higher than your heart.  Keeping your toes above your nose helps to heal the muscles and skin (soft tissues) by reducing swelling in your leg.  This position also helps to prevent infection, and is very important in avoiding deep venous thrombosis (blood clots).    In order to keep the blood circulating in your legs and in order to avoid deep vein   thrombosis (blood clots), we ask patients to GET UP ONCE AN HOUR during the day.  This means you should at least cross the room and come back.  It does not mean you have to be up for long periods of time.  In most cases we will not have people immediately put any weight on their operated part.  This is important to prevent loosening of metal or other devices holding the bones together.  It also prevents irritation of the soft tissues which can lead to prolonged healing.  When we say get up once an hour, please walk, hop or move with an assisted device.  This is important!    Do not do any excessive walking during the first few days after surgery.  Recovering from surgery is a full-time task for the patient.  Postoperative care is important to avoid irritating the skin incision, which can lead to infection.  Please do not plan activities or go out of town for several weeks after surgery.  If you are unsure about your future activities, please schedule surgery only when you know it is acceptable for you.  Scheduling surgery and then canceling the date, prevents other people from having surgery on that date as it takes time to line everything up effectively.  If you cancel your surgery the week of your planned surgery, we reserve the right to cancel all future surgical procedures.    THE DAY OF SURGERY:    Arrival to the  hospital or outpatient surgical center on time is imperative.  If you arrive late, then your surgery will be cancelled.  You MUST have a family member/friend bring you, stay with you throughout the DURATION of your surgery, and drive you home.  You MUST be fasting the day of your surgery.  Therefore, do not consume any food or beverage after midnight the night before surgery.  At your pre-operative visit with the anesthesia staff, or during your phone screen, a nurse will instruct you what medications you will need to take the day of surgery.  MAKE SURE THAT THE PHARMACY LISTED IN THE ELECTRONIC MEDICAL RECORD (EPIC) IS YOUR PREFERRED PHARMACY. For example, if you are staying with family or a friend, and will not be near your preferred pharmacy, YOU MUST, tell the nurses checking you in the day of surgery so that this can be changed in the system.  If your prescriptions are sent to a pharmacy, this cannot be changed.      AFTER YOUR SURGERY:  Bleeding through the bandage almost always occurs.  Do not let this alarm you.  Simply add more gauze or a towel, call us, and come in for a dressing change.   If you think it is excessive, contact us immediately or go to the local emergency room.    Do not get the bandage wet.  Showering is possible with plastic protectors.   Be very careful, as the bathroom can be wet and slippery.  If you do get your dressing wet, it should be changed immediately.  Please contact us.      ONCE YOUR ARE OUT OF YOUR CAST AND/OR REMOVABLE BOOT, SWELLING MAY PERSIST FOR MANY MONTHS.  YOU MIGHT ALSO EXPERIENCE A BLUISH DISCOLORATION OF YOUR LEG.  THIS IS NORMAL AND PART OF THE USUAL POSTOPERATIVE EXPERIENCE.  WEARING COMPRESSION HOSE (ELASTIC STOCKINGS) CAN HELP AVOID SOME OF THIS SWELLING.      DRESSING:   The purpose of the surgical dressing is to keep your wound and the surgical site protected from the environment.  Most dressings contain splints, which help to hold your foot and ankle in a  corrected position, and also allow the surgical site to heal properly. Dressings will remain in place and undisturbed until the first postop visit.   If you have a drain in place, this will need to be removed in 1-3 days after surgery.  The time for the drain to be pulled will be written on your discharge instruction sheet.    CAST  INSTRUCTIONS:  You may or may not get a cast following surgery.  If you do, pay close attention to the following:    After application of a splint or cast, it is very important to elevate your leg for 24 to 72 hours.   The injured area should be elevated well above the heart.   Remember “Toes above your Nose”.  Rest and elevation greatly reduce pain and speed the healing process by minimizing early swelling.    CALL YOUR DOCTORS OFFICE OR VISIT LOCATION EMERGENCY ROOM IF YOU HAVE ANY OF THE FOLLOWING:    Significant increased pain, which may be caused by swelling, and the feeling that the splint or cast is too tight  Numbness and tingling in your hand or foot, which may be caused by too much pressure on the nerves  Burning and stinging, which may be caused by too much pressure on the skin  Excessive swelling below the cast, which may mean the cast is slowing your blood circulation  Loss of active movement of toes, which request an urgent evaluation  Loss of “capillary refill”.  Pinch the tip of toes and kelly the skin.  Release pressure and if the skin does not return pink then call the office immediately.      DO NOT GET YOUR CAST WET.   Bacteria thrive in moist dark areas.  We do not want this.   If your cast becomes wet, return to the office and we will apply another one.    PAIN AFTER SURGERY:  Narcotic pain medication can and will depress your respiratory system if taken in excess.  The goal of pain management with narcotics is to be comfortable not pain free.  If you take enough narcotics to be pain free then you run the risk of stopping breathing.  If this happens, call 911  immediately!  Pain in the heel is often  caused by pressure from the weight of your foot on the bed.  Make sure your heel is suspended off the bed by keeping a pillow underneath your calf not your knee.    Medications:  You will be given narcotic pain medication. Do NOT drive while taking narcotic medications. Medications such as Darvocet, Percocet, Vicoden or Tylenol #3, also contain acetaminophen (Tylenol). Do not take acetaminophen or Tylenol from home when taking theses medications. When you fill your prescription, you may ask the pharmacist if your pain medication has acetaminophen/Tylenol in it. It is okay to take Tylenol with Oxycontin/Oxycodone. Should you have pain after taking your prescription medication, ibuprophen (Motrin, Advil, and Alleve) is a common over the counter preparation and may often be taken with the prescription pain medication as long as you take them with food. These medications can irritate the stomach lining.   Unless you are allergic to aspirin or currently taking a blood thinner, Dr. Lachmans’ patients are requested to take one 325 mg aspirin every 12 hours until you are back to walking normally after surgery (This can be up to 6 weeks).  Narcotic medications commonly cause nausea. Taking them with food will decrease this side effect. If you are having extreme nausea, please contact us for an alternative medication or for something that can be taken with this medication to decrease the nausea.   Also, narcotic medications frequently cause constipation. An increase of fiber, fruits and vegetables in your diet may alleviate this problem, or if necessary, you may use an over-the-counter medication such as senekot, colace, or Fibercon for constipation problems.   You should resume all medications you were taking prior to the surgery unless otherwise specified.     Activity:   Because of your recent foot surgery, your activity level will decrease. You will need to elevate your foot ABOVE  the level of your heart for a minimum of four days. The length of time necessary for the swelling to go down, and for your wounds to heal properly depends greatly on your efforts here. Elevation is extremely important to avoid compromising the blood supply to your foot. Remember when your foot is down it will swell, which will increase pain and slow healing. Wiggle your toes frequently if possible.   If you go home with a regional block, (a type of anesthesia) the foot and leg will be numb. Think of ways to get into your house and around the house until the block wears off.   Keep in mind that it may be a legal issue if you drive while in a cast or splint, especially when the splint is on the right foot. You may call the [employer] of [employer] to schedule a road test if you have adaptive equipment applied to your car.   The amount of weight you are allowed to bear on your foot will be written on your discharge sheet filled out at the time of surgery. The following is an explanation of the possibilities:       COVID-19 Policies  [facility] has the following policies when it comes to ELECTIVE surgery  No elective surgery requiring anesthesia until 7 weeks after a patient tested positive for COVID-19   No elective surgery requiring anesthesia until 3 months after a patient was hospitalized for COVID-19      Non-weight bearing:   You are to put NO weight whatsoever on your foot. When using crutches or a walker, your foot should not touch the ground, except when you are standing. Then, it may rest on the ground. If you are to be non-weight bearing, and you are not compliant, you could compromise the surgery.     Some of our patients have been requesting prescriptions for a roll-a-bout knee scooter. [employer] and other insurances have been denying these claims, and you may either have to rent one or pay out of pocket to purchase one.  THIS SHOULD BE PURCHASED PRIOR TO THE SURGERY AND YOU SHOULD BRING IT  WITH YOU THE DAY OF THE SURGERY TO AIDE IN GETTING FROM THE CAR INTO THE HOUSE AFTER SURGERY.

## 2024-01-05 NOTE — PROGRESS NOTES
James R Lachman, M.D.  Attending, Orthopaedic Surgery  Foot and Ankle  [facility]      ORTHOPAEDIC FOOT AND ANKLE CLINIC VISIT     Assessment:     Encounter Diagnosis   Name Primary?    Osteoarthritis of midfoot due to inflammatory arthritis Yes            Plan:   The patient verbalized understanding of exam findings and treatment plan. We engaged in the shared decision-making process and treatment options were discussed at length with the patient. Surgical and conservative management discussed today along with risks and benefits.  Her most recent injection only provided temporary relief. She continues to have pain despite injections, shoe modification, carbon fiber foot plate, custom orthotics, and NSAIDs.  She is a candidate for Midfoot fusion surgery. This would include Naviculocuneiform joint plus 2nd and 3rd TMT joint arthrodesis, with interpositional arthroplasty 4th and 5th TMT joints  Consent signed in clinic today  Followup 3 weeks postop  CONSENT FOR BONY PROCEDURES:   Patient understands that there is no guarantee that the surgery will relieve all of Her pain and also understands that there may be a prolonged course of protected weight-bearing status required which will restrict them from driving and other activities as discussed at today's visit. Patient recognizes that there are risks with surgery including bleeding, numbness, nerve irritation, wound complications, infection, continued pain, joint stiffness, malunion, nonunion, anesthetic complications, death, failure of procedure and possible need for further surgery. The patient understands that there is no guarantee that this surgery will relieve all of Her pain and symptoms.  Patient understands that there is no guarantee that they will return to full function after the procedure. Patient has provided informed consent for the procedure.        History of Present Illness:   Chief Complaint:   Chief Complaint   Patient  presents with    Left Foot - Follow-up     Last injection didn't help     Julia Engelmann is a 63 y.o. female who is being seen in follow-up for left midfoot arthritis. When we last saw her we recommended injections.  Pain has not improved. Residual pain is localized at midfoot with minimal radiating and described as sharp and severe.      Pain/symptom timing:  Worse during the day when active  Pain/symptom context:  Worse with activites and work  Pain/symptom modifying factors:  Rest makes better, activities make worse  Pain/symptom associated signs/symptoms: none    Prior treatment   NSAIDsYes   Injections Yes   Bracing/Orthotics Yes    Physical Therapy No     Orthopedic Surgical History:   See below    Past Medical, Surgical and Social History:  Past Medical History:  has a past medical history of Celiac disease.  Problem List: does not have a problem list on file.  Past Surgical History:  has a past surgical history that includes Knee surgery (Right, 2015); Knee Arthroplasty (Left, 2014); FL guided needle plac bx/asp/inj (4/25/2023); and FL guided needle plac bx/asp/inj (8/8/2023).  Family History: family history includes Diabetes in her father; Heart attack in her father; Hypertension in her father and mother; Throat cancer in her mother.  Social History:  reports that she has never smoked. She has never used smokeless tobacco. No history on file for alcohol use and drug use.  Current Medications: has a current medication list which includes the following prescription(s): diclofenac and metoprolol succinate.  Allergies: is allergic to acyclovir, demerol [meperidine], percocet [oxycodone-acetaminophen], and tetracycline.     Review of Systems:  General- denies fever/chills  HEENT- denies hearing loss or sore throat  Eyes- denies eye pain or visual disturbances, denies red eyes  Respiratory- denies cough or SOB  Cardio- denies chest pain or palpitations  GI- denies abdominal pain  Endocrine- denies urinary  frequency  Urinary- denies pain with urination  Musculoskeletal- Negative except noted above  Skin- denies rashes or wounds  Neurological- denies dizziness or headache  Psychiatric- denies anxiety or difficulty concentrating    Physical Exam:   There were no vitals taken for this visit.  General/Constitutional: No apparent distress: well-nourished and well developed.  Eyes: normal ocular motion  Lymphatic: No appreciable lymphadenopathy  Respiratory: Non-labored breathing  Vascular: No edema, swelling or tenderness, except as noted in detailed exam.  Integumentary: No impressive skin lesions present, except as noted in detailed exam.  Neuro: No ataxia or tremors noted  Psych: Normal mood and affect, oriented to person, place and time. Appropriate affect.  Musculoskeletal: Normal, except as noted in detailed exam and in HPI.    Examination    Left    Gait Antalgic   Musculoskeletal Tender to palpation at midfoot    Skin Normal.      Nails Normal    Range of Motion  15 degrees dorsiflexion, 20 degrees plantarflexion  Subtalar motion: normal    Stability Stable    Muscle Strength 5/5 tibialis anterior  5/5 gastrocnemius-soleus  5/5 posterior tibialis  5/5 peroneal/eversion strength  5/5 EHL  5/5 FHL    Neurologic Normal    Sensation  Intact to light touch throughout sural, saphenous, superficial peroneal, deep peroneal and medial/lateral plantar nerve distributions.  Bolckow-Mango 5.07 filament (10g) testing deferred.    Cardiovascular Brisk capillary refill < 2 seconds,intact DP and PT pulses    Special Tests None      Imaging Studies:   No new imaging      James R. Lachman, MD  Foot & Ankle Surgery   Department of Orthopaedic Surgery  [facility]      I personally performed the service.    James R. Lachman, MD

## 2024-01-08 ENCOUNTER — OFFICE VISIT (OUTPATIENT)
Dept: PODIATRY | Facility: CLINIC | Age: 64
End: 2024-01-08
Payer: COMMERCIAL

## 2024-01-08 VITALS
HEART RATE: 65 BPM | WEIGHT: 136 LBS | BODY MASS INDEX: 26.7 KG/M2 | HEIGHT: 60 IN | SYSTOLIC BLOOD PRESSURE: 124 MMHG | DIASTOLIC BLOOD PRESSURE: 75 MMHG

## 2024-01-08 DIAGNOSIS — Q82.8 POROKERATOSIS: Primary | ICD-10-CM

## 2024-01-08 DIAGNOSIS — M19.072 OSTEOARTHRITIS OF LEFT ANKLE OR FOOT: ICD-10-CM

## 2024-01-08 DIAGNOSIS — M79.672 PAIN IN LEFT FOOT: ICD-10-CM

## 2024-01-08 PROCEDURE — 99213 OFFICE O/P EST LOW 20 MIN: CPT | Performed by: PODIATRIST

## 2024-01-08 RX ORDER — HYDROCODONE BITARTRATE AND ACETAMINOPHEN 5; 325 MG/1; MG/1
1 TABLET ORAL EVERY 6 HOURS PRN
COMMUNITY

## 2024-01-08 NOTE — PROGRESS NOTES
Assessment/Plan:   Paring benign hyperkeratotic lesion left foot, padding dispensed.  Discussed with patient the importance of proper supportive shoe gear once she heals her midfoot fusion.  She will require a new custom molded orthotic as the current one will not fit properly after the surgery.  Follow-up in 3 months.       Diagnoses and all orders for this visit:    Porokeratosis    Pain in left foot    Osteoarthritis of left ankle or foot    Other orders  -     HYDROcodone-acetaminophen (NORCO) 5-325 mg per tablet; Take 1 tablet by mouth every 6 (six) hours as needed for pain          Subjective:     Patient ID: Julia Engelmann is a 63 y.o. female.    1/8/2023: 63-year-old female seen today for follow-up reports the painful lesion has come back again and is causing her significant pain/discomfort that makes her limp.  Reports her arthritis is about the same which is fairly constant and she is now scheduled to have a midfoot fusion in February 2024 to address her arthritic issues.    10/6/2023: 63-year-old female seen today for follow-up reports her left midfoot arthritis seems to be doing okay and continues to take Voltaren twice a day.  Has not been needing pain meds at night near as much.  Secondary concern of painful lesion plantar aspect of her left foot laterally which seems to be giving her more trouble.    6/23/2023: 62-year-old female presents concerned with continued breakthrough pain of her left midfoot.  Patient reports that she continues to take Voltaren twice a day does seem to help throughout the day but at night when it is time to sleep she has aching pain that does not let up.  Pain is localized to the same left midfoot area.  Chronic lesion on the plantar aspect of the left foot remains unchanged.  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        Review of Systems   Constitutional: Negative.    HENT: Negative.     Eyes: Negative.    Respiratory: Negative.      Cardiovascular: Negative.    Gastrointestinal: Negative.    Endocrine: Negative.    Genitourinary: Negative.    Musculoskeletal:  Positive for arthralgias (Left midfoot).   Skin:         Plantar lateral left foot, painful lesion which appears to be getting larger   Allergic/Immunologic: Negative.    Neurological: Negative.    Hematological: Negative.    Psychiatric/Behavioral: Negative.           Objective:     Physical Exam  Constitutional:       Appearance: Normal appearance.   HENT:      Head: Normocephalic.      Right Ear: External ear normal.      Left Ear: External ear normal.   Eyes:      Pupils: Pupils are equal, round, and reactive to light.   Cardiovascular:      Rate and Rhythm: Normal rate.      Pulses:           Dorsalis pedis pulses are 1+ on the right side and 1+ on the left side.        Posterior tibial pulses are 2+ on the right side and 2+ on the left side.   Pulmonary:      Effort: Pulmonary effort is normal.   Musculoskeletal:         General: Tenderness and deformity (Midfoot collapse with severe degenerative tarsometatarsal joint arthritis.) present.      Cervical back: Normal range of motion.   Feet:      Right foot:      Protective Sensation: 10 sites tested.  10 sites sensed.      Skin integrity: Skin integrity normal.      Left foot:      Protective Sensation: 10 sites tested.  10 sites sensed.      Skin integrity: Callus (Nucleated porokeratotic lesion subfifth metatarsal base) present.      Toenail Condition: Left toenails are normal.   Skin:     General: Skin is warm and dry.      Capillary Refill: Capillary refill takes 2 to 3 seconds.   Neurological:      General: No focal deficit present.      Mental Status: She is alert.   Psychiatric:         Mood and Affect: Mood normal.

## 2024-01-09 ENCOUNTER — OFFICE VISIT (OUTPATIENT)
Dept: LAB | Facility: HOSPITAL | Age: 64
End: 2024-01-09
Payer: COMMERCIAL

## 2024-01-09 DIAGNOSIS — Z01.818 PREOP TESTING: ICD-10-CM

## 2024-01-09 LAB
ATRIAL RATE: 59 BPM
P AXIS: 22 DEGREES
PR INTERVAL: 174 MS
QRS AXIS: 32 DEGREES
QRSD INTERVAL: 82 MS
QT INTERVAL: 412 MS
QTC INTERVAL: 407 MS
T WAVE AXIS: 13 DEGREES
VENTRICULAR RATE: 59 BPM

## 2024-01-09 PROCEDURE — 93005 ELECTROCARDIOGRAM TRACING: CPT

## 2024-02-08 RX ORDER — CETIRIZINE HYDROCHLORIDE 10 MG/1
10 TABLET ORAL DAILY
COMMUNITY

## 2024-02-08 RX ORDER — MAGNESIUM OXIDE/MAG AA CHELATE 300 MG
CAPSULE ORAL EVERY 24 HOURS
COMMUNITY

## 2024-02-08 RX ORDER — CHLORAL HYDRATE 500 MG
1 CAPSULE ORAL EVERY 24 HOURS
COMMUNITY

## 2024-02-08 RX ORDER — BIOTIN 10000 MCG
CAPSULE ORAL EVERY 24 HOURS
COMMUNITY

## 2024-02-08 NOTE — PRE-PROCEDURE INSTRUCTIONS
Pre-Surgery Instructions:   Medication Instructions    Biotin 10 MG CAPS Stop taking 7 days prior to surgery.    Calcium-Magnesium-Vitamin D (CALCIUM 1200+D3 PO) Stop taking 7 days prior to surgery.    cetirizine (ZyrTEC) 10 mg tablet Take day of surgery.    diclofenac (VOLTAREN) 75 mg EC tablet Stop taking 7 days prior to surgery.    HYDROcodone-acetaminophen (NORCO) 5-325 mg per tablet Uses PRN- OK to take day of surgery    Magnesium 300 MG CAPS Stop taking 7 days prior to surgery.    metoprolol succinate (TOPROL-XL) 50 mg 24 hr tablet Take day of surgery.    Omega-3 Fatty Acids (fish oil) 1,000 mg Stop taking 7 days prior to surgery.    Medication instructions for day surgery reviewed. Please use only a sip of water to take your instructed medications. Avoid all over the counter vitamins, supplements and NSAIDS for one week prior to surgery per anesthesia guidelines. Tylenol is ok to take as needed.     You will receive a call one business day prior to surgery with an arrival time and hospital directions. If your surgery is scheduled on a Monday, the hospital will be calling you on the Friday prior to your surgery. If you have not heard from anyone by 8pm, please call the hospital supervisor through the hospital  at [phone]. ([place] [phone] or [place] [phone]).    Do not eat or drink anything after midnight the night before your surgery, including candy, mints, lifesavers, or chewing gum. Do not drink alcohol 24hrs before your surgery. Try not to smoke at least 24hrs before your surgery.       Follow the pre surgery showering instructions as listed in the “My Surgical Experience Booklet” or otherwise provided by your surgeon's office. Do not use a blade to shave the surgical area 1 week before surgery. It is okay to use a clean electric clippers up to 24 hours before surgery. Do not apply any lotions, creams, including makeup, cologne, deodorant, or perfumes after showering on the day of  your surgery. Do not use dry shampoo, hair spray, hair gel, or any type of hair products.     No contact lenses, eye make-up, or artificial eyelashes. Remove nail polish, including gel polish, and any artificial, gel, or acrylic nails if possible. Remove all jewelry including rings and body piercing jewelry.     Wear causal clothing that is easy to take on and off. Consider your type of surgery.    Keep any valuables, jewelry, piercings at home. Please bring any specially ordered equipment (sling, braces) if indicated. Pt bringing crutches.    Arrange for a responsible person to drive you to and from the hospital on the day of your surgery. Visitor Guidelines discussed.     Call the surgeon's office with any new illnesses, exposures, or additional questions prior to surgery.    Please reference your “My Surgical Experience Booklet” for additional information to prepare for your upcoming surgery.

## 2024-02-18 ENCOUNTER — ANESTHESIA EVENT (OUTPATIENT)
Dept: PERIOP | Facility: HOSPITAL | Age: 64
End: 2024-02-18
Payer: COMMERCIAL

## 2024-02-19 ENCOUNTER — APPOINTMENT (OUTPATIENT)
Dept: RADIOLOGY | Facility: HOSPITAL | Age: 64
End: 2024-02-19
Payer: COMMERCIAL

## 2024-02-19 ENCOUNTER — ANESTHESIA (OUTPATIENT)
Dept: PERIOP | Facility: HOSPITAL | Age: 64
End: 2024-02-19
Payer: COMMERCIAL

## 2024-02-19 ENCOUNTER — HOSPITAL ENCOUNTER (OUTPATIENT)
Facility: HOSPITAL | Age: 64
Setting detail: OUTPATIENT SURGERY
Discharge: HOME | End: 2024-02-19
Attending: ORTHOPAEDIC SURGERY | Admitting: ORTHOPAEDIC SURGERY
Payer: COMMERCIAL

## 2024-02-19 VITALS
DIASTOLIC BLOOD PRESSURE: 75 MMHG | HEART RATE: 64 BPM | BODY MASS INDEX: 26.21 KG/M2 | SYSTOLIC BLOOD PRESSURE: 112 MMHG | TEMPERATURE: 97.4 F | RESPIRATION RATE: 26 BRPM | WEIGHT: 130 LBS | OXYGEN SATURATION: 96 % | HEIGHT: 59 IN

## 2024-02-19 DIAGNOSIS — M19.90 OSTEOARTHRITIS OF MIDFOOT DUE TO INFLAMMATORY ARTHRITIS: Primary | ICD-10-CM

## 2024-02-19 DIAGNOSIS — M19.279 OSTEOARTHRITIS OF MIDFOOT DUE TO INFLAMMATORY ARTHRITIS: Primary | ICD-10-CM

## 2024-02-19 PROBLEM — I10 HTN (HYPERTENSION): Status: ACTIVE | Noted: 2024-02-19

## 2024-02-19 PROCEDURE — 28899 UNLISTED PX FOOT/TOES: CPT

## 2024-02-19 PROCEDURE — C1713 ANCHOR/SCREW BN/BN,TIS/BN: HCPCS | Performed by: ORTHOPAEDIC SURGERY

## 2024-02-19 PROCEDURE — C1781 MESH (IMPLANTABLE): HCPCS | Performed by: ORTHOPAEDIC SURGERY

## 2024-02-19 PROCEDURE — 28737 REVISION OF FOOT BONES: CPT

## 2024-02-19 PROCEDURE — 28730 FUSION OF FOOT BONES: CPT

## 2024-02-19 PROCEDURE — 28899 UNLISTED PX FOOT/TOES: CPT | Performed by: ORTHOPAEDIC SURGERY

## 2024-02-19 PROCEDURE — C1734 ORTH/DEVIC/DRUG BN/BN,TIS/BN: HCPCS | Performed by: ORTHOPAEDIC SURGERY

## 2024-02-19 PROCEDURE — C9290 INJ, BUPIVACAINE LIPOSOME: HCPCS | Performed by: ANESTHESIOLOGY

## 2024-02-19 PROCEDURE — 28730 FUSION OF FOOT BONES: CPT | Performed by: ORTHOPAEDIC SURGERY

## 2024-02-19 PROCEDURE — 73620 X-RAY EXAM OF FOOT: CPT

## 2024-02-19 PROCEDURE — NC001 PR NO CHARGE: Performed by: ORTHOPAEDIC SURGERY

## 2024-02-19 PROCEDURE — 28737 REVISION OF FOOT BONES: CPT | Performed by: ORTHOPAEDIC SURGERY

## 2024-02-19 DEVICE — BROAD STRAIGHT PLATE
Type: IMPLANTABLE DEVICE | Site: FOOT | Status: FUNCTIONAL
Brand: VARIAX

## 2024-02-19 DEVICE — HEADLESS COMPRESSION SCREW
Type: IMPLANTABLE DEVICE | Site: FOOT | Status: FUNCTIONAL
Brand: FIXOS

## 2024-02-19 DEVICE — BONE SCREW
Type: IMPLANTABLE DEVICE | Site: FOOT | Status: FUNCTIONAL
Brand: VARIAX

## 2024-02-19 DEVICE — LOCKING SCREW
Type: IMPLANTABLE DEVICE | Site: FOOT | Status: FUNCTIONAL
Brand: VARIAX

## 2024-02-19 DEVICE — IMPLANTABLE DEVICE
Type: IMPLANTABLE DEVICE | Site: FOOT | Status: FUNCTIONAL
Brand: CLAW II ORTHOLOC 3DSI

## 2024-02-19 DEVICE — IMPLANTABLE DEVICE: Type: IMPLANTABLE DEVICE | Site: FOOT | Status: FUNCTIONAL

## 2024-02-19 DEVICE — IMPLANTABLE DEVICE
Type: IMPLANTABLE DEVICE | Site: FOOT | Status: FUNCTIONAL
Brand: ORTHOLOC 3DSI

## 2024-02-19 DEVICE — GRAFT BIO4 ORTHO BONE MATRIX 10ML: Type: IMPLANTABLE DEVICE | Site: FOOT | Status: FUNCTIONAL

## 2024-02-19 RX ORDER — PROPOFOL 10 MG/ML
INJECTION, EMULSION INTRAVENOUS CONTINUOUS PRN
Status: DISCONTINUED | OUTPATIENT
Start: 2024-02-19 | End: 2024-02-19

## 2024-02-19 RX ORDER — DEXAMETHASONE SODIUM PHOSPHATE 10 MG/ML
INJECTION, SOLUTION INTRAMUSCULAR; INTRAVENOUS AS NEEDED
Status: DISCONTINUED | OUTPATIENT
Start: 2024-02-19 | End: 2024-02-19

## 2024-02-19 RX ORDER — GLYCOPYRROLATE 0.2 MG/ML
INJECTION INTRAMUSCULAR; INTRAVENOUS AS NEEDED
Status: DISCONTINUED | OUTPATIENT
Start: 2024-02-19 | End: 2024-02-19

## 2024-02-19 RX ORDER — CEFAZOLIN SODIUM 1 G/50ML
1000 SOLUTION INTRAVENOUS ONCE
Status: COMPLETED | OUTPATIENT
Start: 2024-02-19 | End: 2024-02-19

## 2024-02-19 RX ORDER — SCOLOPAMINE TRANSDERMAL SYSTEM 1 MG/1
1 PATCH, EXTENDED RELEASE TRANSDERMAL ONCE
Status: DISCONTINUED | OUTPATIENT
Start: 2024-02-19 | End: 2024-02-19 | Stop reason: HOSPADM

## 2024-02-19 RX ORDER — ONDANSETRON 2 MG/ML
4 INJECTION INTRAMUSCULAR; INTRAVENOUS ONCE
Status: DISCONTINUED | OUTPATIENT
Start: 2024-02-19 | End: 2024-02-19 | Stop reason: HOSPADM

## 2024-02-19 RX ORDER — FENTANYL CITRATE/PF 50 MCG/ML
25 SYRINGE (ML) INJECTION
Status: DISCONTINUED | OUTPATIENT
Start: 2024-02-19 | End: 2024-02-19 | Stop reason: HOSPADM

## 2024-02-19 RX ORDER — EPHEDRINE SULFATE 50 MG/ML
INJECTION INTRAVENOUS AS NEEDED
Status: DISCONTINUED | OUTPATIENT
Start: 2024-02-19 | End: 2024-02-19

## 2024-02-19 RX ORDER — BUPIVACAINE HYDROCHLORIDE 2.5 MG/ML
INJECTION, SOLUTION EPIDURAL; INFILTRATION; INTRACAUDAL AS NEEDED
Status: DISCONTINUED | OUTPATIENT
Start: 2024-02-19 | End: 2024-02-19

## 2024-02-19 RX ORDER — FENTANYL CITRATE 50 UG/ML
INJECTION, SOLUTION INTRAMUSCULAR; INTRAVENOUS AS NEEDED
Status: DISCONTINUED | OUTPATIENT
Start: 2024-02-19 | End: 2024-02-19

## 2024-02-19 RX ORDER — ONDANSETRON 2 MG/ML
INJECTION INTRAMUSCULAR; INTRAVENOUS AS NEEDED
Status: DISCONTINUED | OUTPATIENT
Start: 2024-02-19 | End: 2024-02-19

## 2024-02-19 RX ORDER — MIDAZOLAM HYDROCHLORIDE 2 MG/2ML
INJECTION, SOLUTION INTRAMUSCULAR; INTRAVENOUS AS NEEDED
Status: DISCONTINUED | OUTPATIENT
Start: 2024-02-19 | End: 2024-02-19

## 2024-02-19 RX ORDER — TRAMADOL HYDROCHLORIDE 50 MG/1
50 TABLET ORAL ONCE AS NEEDED
Status: DISCONTINUED | OUTPATIENT
Start: 2024-02-19 | End: 2024-02-19 | Stop reason: HOSPADM

## 2024-02-19 RX ORDER — SCOLOPAMINE TRANSDERMAL SYSTEM 1 MG/1
1 PATCH, EXTENDED RELEASE TRANSDERMAL
Status: DISCONTINUED | OUTPATIENT
Start: 2024-02-19 | End: 2024-02-19 | Stop reason: HOSPADM

## 2024-02-19 RX ORDER — SODIUM CHLORIDE, SODIUM LACTATE, POTASSIUM CHLORIDE, CALCIUM CHLORIDE 600; 310; 30; 20 MG/100ML; MG/100ML; MG/100ML; MG/100ML
INJECTION, SOLUTION INTRAVENOUS CONTINUOUS PRN
Status: DISCONTINUED | OUTPATIENT
Start: 2024-02-19 | End: 2024-02-19

## 2024-02-19 RX ORDER — LIDOCAINE HYDROCHLORIDE 10 MG/ML
INJECTION, SOLUTION EPIDURAL; INFILTRATION; INTRACAUDAL; PERINEURAL AS NEEDED
Status: DISCONTINUED | OUTPATIENT
Start: 2024-02-19 | End: 2024-02-19

## 2024-02-19 RX ORDER — MAGNESIUM HYDROXIDE 1200 MG/15ML
LIQUID ORAL AS NEEDED
Status: DISCONTINUED | OUTPATIENT
Start: 2024-02-19 | End: 2024-02-19 | Stop reason: HOSPADM

## 2024-02-19 RX ORDER — CHLORHEXIDINE GLUCONATE 4 G/100ML
SOLUTION TOPICAL DAILY PRN
Status: DISCONTINUED | OUTPATIENT
Start: 2024-02-19 | End: 2024-02-19 | Stop reason: HOSPADM

## 2024-02-19 RX ORDER — CHLORHEXIDINE GLUCONATE ORAL RINSE 1.2 MG/ML
15 SOLUTION DENTAL ONCE
Status: DISCONTINUED | OUTPATIENT
Start: 2024-02-19 | End: 2024-02-19 | Stop reason: HOSPADM

## 2024-02-19 RX ORDER — VANCOMYCIN HYDROCHLORIDE 1 G/20ML
INJECTION, POWDER, LYOPHILIZED, FOR SOLUTION INTRAVENOUS AS NEEDED
Status: DISCONTINUED | OUTPATIENT
Start: 2024-02-19 | End: 2024-02-19 | Stop reason: HOSPADM

## 2024-02-19 RX ORDER — PROPOFOL 10 MG/ML
INJECTION, EMULSION INTRAVENOUS AS NEEDED
Status: DISCONTINUED | OUTPATIENT
Start: 2024-02-19 | End: 2024-02-19

## 2024-02-19 RX ORDER — HYDROCODONE BITARTRATE AND ACETAMINOPHEN 5; 325 MG/1; MG/1
1 TABLET ORAL EVERY 6 HOURS PRN
Qty: 30 TABLET | Refills: 0 | Status: SHIPPED | OUTPATIENT
Start: 2024-02-19 | End: 2024-02-29

## 2024-02-19 RX ADMIN — BUPIVACAINE HYDROCHLORIDE 10 ML: 2.5 INJECTION, SOLUTION EPIDURAL; INFILTRATION; INTRACAUDAL at 10:24

## 2024-02-19 RX ADMIN — PROPOFOL 40 MG: 10 INJECTION, EMULSION INTRAVENOUS at 12:26

## 2024-02-19 RX ADMIN — PHENYLEPHRINE HYDROCHLORIDE 100 MCG: 10 INJECTION, SOLUTION INTRAVENOUS at 11:08

## 2024-02-19 RX ADMIN — FENTANYL CITRATE 50 MCG: 50 INJECTION INTRAMUSCULAR; INTRAVENOUS at 11:55

## 2024-02-19 RX ADMIN — BUPIVACAINE HYDROCHLORIDE 20 ML: 2.5 INJECTION, SOLUTION EPIDURAL; INFILTRATION; INTRACAUDAL at 10:29

## 2024-02-19 RX ADMIN — CEFAZOLIN SODIUM 1000 MG: 1 SOLUTION INTRAVENOUS at 10:48

## 2024-02-19 RX ADMIN — BUPIVACAINE 10 ML: 13.3 INJECTION, SUSPENSION, LIPOSOMAL INFILTRATION at 10:29

## 2024-02-19 RX ADMIN — LIDOCAINE HYDROCHLORIDE 0.5 ML: 10 INJECTION, SOLUTION EPIDURAL; INFILTRATION; INTRACAUDAL; PERINEURAL at 10:23

## 2024-02-19 RX ADMIN — ONDANSETRON 4 MG: 2 INJECTION INTRAMUSCULAR; INTRAVENOUS at 12:14

## 2024-02-19 RX ADMIN — FENTANYL CITRATE 25 MCG: 50 INJECTION INTRAMUSCULAR; INTRAVENOUS at 12:26

## 2024-02-19 RX ADMIN — BUPIVACAINE 10 ML: 13.3 INJECTION, SUSPENSION, LIPOSOMAL INFILTRATION at 10:24

## 2024-02-19 RX ADMIN — EPHEDRINE SULFATE 10 MG: 50 INJECTION INTRAVENOUS at 10:56

## 2024-02-19 RX ADMIN — LIDOCAINE HYDROCHLORIDE 50 MG: 10 INJECTION, SOLUTION EPIDURAL; INFILTRATION; INTRACAUDAL; PERINEURAL at 10:46

## 2024-02-19 RX ADMIN — LIDOCAINE HYDROCHLORIDE 0.5 ML: 10 INJECTION, SOLUTION EPIDURAL; INFILTRATION; INTRACAUDAL; PERINEURAL at 10:28

## 2024-02-19 RX ADMIN — DEXAMETHASONE SODIUM PHOSPHATE 10 MG: 10 INJECTION, SOLUTION INTRAMUSCULAR; INTRAVENOUS at 10:46

## 2024-02-19 RX ADMIN — SODIUM CHLORIDE, SODIUM LACTATE, POTASSIUM CHLORIDE, AND CALCIUM CHLORIDE: .6; .31; .03; .02 INJECTION, SOLUTION INTRAVENOUS at 10:42

## 2024-02-19 RX ADMIN — PHENYLEPHRINE HYDROCHLORIDE 100 MCG: 10 INJECTION, SOLUTION INTRAVENOUS at 10:53

## 2024-02-19 RX ADMIN — PROPOFOL 200 MG: 10 INJECTION, EMULSION INTRAVENOUS at 10:46

## 2024-02-19 RX ADMIN — SCOPALAMINE 1 PATCH: 1 PATCH, EXTENDED RELEASE TRANSDERMAL at 09:49

## 2024-02-19 RX ADMIN — PROPOFOL 100 MCG/KG/MIN: 10 INJECTION, EMULSION INTRAVENOUS at 10:54

## 2024-02-19 RX ADMIN — PROPOFOL 20 MG: 10 INJECTION, EMULSION INTRAVENOUS at 11:29

## 2024-02-19 RX ADMIN — MIDAZOLAM 2 MG: 1 INJECTION INTRAMUSCULAR; INTRAVENOUS at 10:22

## 2024-02-19 RX ADMIN — GLYCOPYRROLATE 0.1 MG: 0.2 INJECTION INTRAMUSCULAR; INTRAVENOUS at 10:54

## 2024-02-19 RX ADMIN — PHENYLEPHRINE HYDROCHLORIDE 100 MCG: 10 INJECTION, SOLUTION INTRAVENOUS at 11:21

## 2024-02-19 RX ADMIN — FENTANYL CITRATE 25 MCG: 50 INJECTION INTRAMUSCULAR; INTRAVENOUS at 11:30

## 2024-02-19 RX ADMIN — FENTANYL CITRATE 25 MCG: 50 INJECTION INTRAMUSCULAR; INTRAVENOUS at 10:22

## 2024-02-19 NOTE — OP NOTE
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 [M19.279, M19.90]    Procedure(s):  Left - Left 2nd and 3rd and naviculocuneiform joint fusions with 4th and 5th TMT joint interpositional arthroplasties    Specimen(s):  * No specimens in log *    Estimated Blood Loss:   50 mL    Drains:  * No LDAs found *    Anesthesia Type:   Choice    Operative Indications:  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  Dr. Lachman reviewed the procedure with the patient, reviewed the consent form, and marked the correct extremity.  The patient was then taken to the operating room and the nursing staff reviewed the patient indentification and operative consent form.  The patient was then placed in supine position on the operating room table and a seatbelt was placed.  All bony prominences were well padded.       AA prescrub with chlorhexidine and alcohol was performed. The left lower extremity was then prepped and draped in sterile fashion.  A timeout was performed by   Lachman that identified the correct patient, correct procedure, correct extremity, the fact that antibiotics were given within one hour prior to the incision and that the correct instruments were available to proceed with the case.  The operative team introduced themselves if any new members were present.     An esmarch was used to exsanguinate the left leg and used as a tourniquet.  See anesthesia documentation for tourniquet time.   Attention was turned to dorsal midfoot foot and a standard linear incision in line with the lateral border of the 1st TMT was made.  Sharp dissection was taken through the skin and bovie electrocautery was used to control hemostasis.  Scissor dissection was used for the deep dissection using caution to protect the superficial peroneal nerve branches traversing the surgical field.  The interval between EHL and EHB tendons were identified and the deep neurovascular bundle was identified, mobilized and retracted.       We continued dissection over the cuneiforms and identified the 2nd and 3rd TMT joints. Using a rongeur, we removed the osteophytes protruding dorsally.  We used a Hintermann distractor to expose the 2nd TMT joint. We used the wooden handle periosteal elevator to remove the cartilage in these joints. We then irrigated thoroughly and then fenestrated the subchondral plate with a 2.0mm drill bit destroying the subchondral bone to encourage a healing surface. We then placed Daggett Bio4 bone graft into the arthrodesis site.  Next we did the same thing to the 3rd TMT joint first removing the cartilage then drilling to prepare the bony surface for healing.  We placed bone graft into the 3rd TMT joint as well.      We then found the NC joints. We used a Hintermann and exposed these joints. Using a periosteal elevator, we removed the remaining cartilage.  Then we drilled and fenestrated the subchondral plate on both sides of the joints.  We then placed Bio4 in the joints to aide in  fusion.    Next, we pinned the joints and confirmed reduction on fluoroscopy.  We then drilled and placed a 4.0mm headless compression screw from the 2nd metatarsal through the middle cuneiform and into the navicular across each joint to stabilize the reduction. We then drilled and placed a headless compressions crew across the middle cuneiform and into the navicular to add compression across that joint. Finally, we drilled and placed another compression screw from the 3rd metatarsal across the 3rd TMT joint, across the lateral cuneiform and into the navicular to add compression across these joints.     We confirmed the reduction on fluoroscopy.  Next we sized a bridging dorsal plate from the navicular, across the middle cuneiform to the 2nd metatarsal and then another bridge plate from the navicular, across the lateral cuneiform and onto the 3rd metatarsal.  We confirmed plate position and maintained midfoot reduction on fluoroscopy and then filled the plates with appropriate sized screws. Finally, we placed a small compression screw across the medial naviculocuneiform joint.  We used the remaining bone graft and packed it into the arthrodesis sites.    We made another incision, parallel to the previous over the interspace between the 4th and 5th TMT joints. We used blunt dissection to access the TMT joints and then used a nevaeh to remove parts of the cuboid and parts of the metatarsals to create a space between the bones and eliminate the arthritis using caution to maintain ligamentus stability in these joints.    Next, we balled up a graft jacket piece and placed it in the joint as an interposition to prevent and bone rubbing against bone.     Fluoroscopy was used to confirm alignment.  Dr. Lachman independently reviewed and assessed all fluoroscopic images.  The wound was copiously irrigated. Vancomycin powder, 500mg, was used in the wound bed.  The tourniquet was released and all bleeding was controlled using  the electrocautery.     All incisions were closed with a combination of 2-0 vicryl for the deep tissues, 4-0 vicryl for subcutaneous tissue, and 3-0 nylon for the skin.  A well padded splint was placed.  All toes were pink and warm at the end of the case.   I was present for the entire procedure     I was present for the entire procedure., A qualified resident physician was not available., and A physician assistant was required during the procedure for retraction, tissue handling, dissection and suturing.    Patient Disposition:  PACU         SIGNATURE: James R Lachman, MD  DATE: February 19, 2024  TIME: 12:37 PM

## 2024-02-19 NOTE — DISCHARGE INSTR - AVS FIRST PAGE
James R Lachman, M.D.  Attending, Orthopaedic Surgery  [facility]  [place] Office Phone: [phone] ? Fax: [phone]  [place] Office Phone: [phone] ? Fax:[phone]    : Regino Cisneros MA     Surgery Coordinators [place]: Yvette Hemphill, [phone]  Yuliya Centeno, [phone]  Surgery Coordinator [place]:  Reese Burk, [phone]                                                        Lina Gutiérrez, [phone]                                                              [email or web address]   POST-OPERATIVE INSTRUCTIONS    General Information:  Typical post operative visits are at the following intervals:  2-3 weeks post surgery, 6 weeks post surgery, 3 months post surgery, 6 months post surgery, and then on a yearly basis.  However, this may change based on Dr. Lachmans’ recommendation.  #1 post-operative rule for foot/ankle surgery:  ONCE YOU ARE OUT OF YOUR CAST AND/OR REMOVABLE BOOT, SWELLING MAY PERSIST FOR MANY MONTHS.  YOU MIGHT ALSO EXPERIENCE A BLUISH DISCOLORATION OF YOUR LEG.  THIS IS NORMAL AND PART OF THE USUAL POSTOPERATIVE EXPERIENCE.  DO NOT WAIT UNTIL YOUR BLOCK WEARS OFF TO TAKE YOUR PAIN MEDICATION.  IT TAKES A FEW DOSES OF THE PAIN MEDICATION TO REACH A THERAPEUTIC LEVEL.  TAKE A TABLET PROACTIVELY BEFORE YOU HAVE ANY PAIN AND AGAIN 4 HOURS LATER SO WHEN THE BLOCK WEARS OFF, YOU ARE NOT CAUGHT OFF GUARD.    SMOKING:  Smoking results in incomplete healing of fractures (broken bones) and joints that my have been fused.  Smoking and nicotine also prevents the growth of bone into ankle replacements and bone healing.  It also slows the healing of muscles and skin (soft tissue).  Therefore, please do not have surgery if you continue to smoke.  We reserve the right to cancel your surgery if we suspect that you are smoking.  DO NOT use nicorette gum or other patches.  Please find an alternative  method to quit smoking before your surgery and do not restart after surgery to allow for healing.      THREE RULES:    After surgery you will most likely be given the instructions “KEEP YOUR TOES ABOVE YOUR NOSE.”  This means that you MUST have your feet elevated higher than your heart.  Keeping your toes above your nose helps to heal the muscles and skin (soft tissues) by reducing swelling in your leg.  This position also helps to prevent infection, and is very important in avoiding deep venous thrombosis (blood clots).    In order to keep the blood circulating in your legs and in order to avoid deep vein   thrombosis (blood clots), we ask patients to GET UP ONCE AN HOUR during the day.  This means you should at least cross the room and come back.  It does not mean you have to be up for long periods of time.  In most cases we will not have people immediately put any weight on their operated part.  This is important to prevent loosening of metal or other devices holding the bones together.  It also prevents irritation of the soft tissues which can lead to prolonged healing.  When we say get up once an hour, please walk, hop or move with an assisted device.  This is important!    Do not do any excessive walking during the first few days after surgery.  Recovering from surgery is a full-time task for the patient.  Postoperative care is important to avoid irritating the skin incision, which can lead to infection.  Please do not plan activities or go out of town for several weeks after surgery.        AFTER YOUR SURGERY:  Bleeding through the bandage almost always occurs.  Do not let this alarm you.  Simply overwrap with an ABD pad and Ace bandage (The nurses discharging your from the day of surgery will provide this.)   If you think it is excessive, you can come in early for a dressing change (at around 1 week instead of 3 weeks postop.)    Do not get the bandage wet.  Showering is possible with plastic protectors.    Be very careful, as the bathroom can be wet and slippery.  If you do get your dressing wet, it should be changed immediately.  Please contact us.      ONCE YOUR ARE OUT OF YOUR CAST AND/OR REMOVABLE BOOT, SWELLING MAY PERSIST FOR MANY MONTHS.  THERE WILL ALSO BE A BLUISH DISCOLORATION OF YOUR LEG FOR MONTHS.  THIS IS NORMAL AND PART OF THE USUAL POSTOPERATIVE EXPERIENCE.  WEARING COMPRESSION HOSE (ELASTIC STOCKINGS) CAN HELP AVOID SOME OF THIS SWELLING.    Ice the area 20 minutes every hour once the nerve block wears off. If you are in a cast or a splint, you may need to leave the ice on longer than 20 minutes in order to feel any benefits.       DRESSING:   The purpose of the surgical dressing is to keep your wound and the surgical site protected from the environment.  Most dressings contain splints, which help to hold your foot and ankle in a corrected position, and also allow the surgical site to heal properly.   If you have a drain in place, this will need to be removed in 1 day after surgery.  The time for the drain to be pulled will be written on your discharge instruction sheet.    CAST  INSTRUCTIONS:  You may or may not get a cast following surgery.  If you do, pay close attention to the following:    After application of a splint or cast, it is very important to elevate your leg for 24 to 72 hours.   The injured area should be elevated well above the heart.   Remember “Toes above your Nose”.  Rest and elevation greatly reduce pain and speed the healing process by minimizing early swelling.    CALL YOUR DOCTORS OFFICE OR VISIT LOCATION EMERGENCY ROOM IF YOU HAVE ANY OF THE FOLLOWING:    Significant increased pain, which may be caused by swelling (Strict elevation will alleviate this)  Numbness and tingling in your hand or foot, which may be caused by too much pressure on the nerves (There is always some numbness after surgery due to nerve blocks)  Burning and stinging, which may be caused by too much  pressure on the skin  Excessive swelling below the cast, which may mean the cast is slowing your blood circulation  Loss of active movement of toes, which request an urgent evaluation  Loss of “capillary refill”.  Pinch the tip of toes and kelly the skin.  Release pressure and if the skin does not return pink then call the office immediately.      DO NOT GET YOUR CAST WET.   Bacteria thrive in moist dark areas.  We do not want this.   If your cast becomes wet, return to the office and we will apply another one.    PAIN AFTER SURGERY:  Narcotic pain medication can and will depress your respiratory system if taken in excess.  The goal of pain management with narcotics is to be comfortable not pain free.  If you take enough narcotics to be pain free then you run the risk of stopping breathing.  If this happens, call 911 immediately!  Pain in the heel is often  caused by pressure from the weight of your foot on the bed.  Make sure your heel is suspended off the bed by keeping a pillow underneath your calf not your knee.    Medications:  You will be given narcotic pain medication. Do NOT drive while taking narcotic medications. Medications such as Darvocet, Percocet, Vicoden or Tylenol #3, also contain acetaminophen (Tylenol). Do not take acetaminophen or Tylenol from home when taking theses medications. When you fill your prescription, you may ask the pharmacist if your pain medication has acetaminophen/Tylenol in it. It is okay to take Tylenol with Oxycontin/Oxycodone.   Unless you are allergic to aspirin or currently taking a blood thinner, Dr. Lachmans’ patients are requested to take one 325 mg aspirin every 12 hours until you are back to walking normally after surgery (This can be up to 6 weeks). Ecotrin (Enteric-coated aspirin) is more sensitive to the stomach and we recommend purchasing this instead of regular aspirin to minimize the risk of stomach irritation.  Narcotic medications commonly cause nausea. Taking  them with food will decrease this side effect. If you are having extreme nausea, please contact us for an alternative medication or for something that can be taken with this medication to decrease the nausea.   Also, narcotic medications frequently cause constipation. An increase of fiber, fruits and vegetables in your diet may alleviate this problem, or if necessary, you may use an over-the-counter medication such as senekot, colace, or Fibercon for constipation problems.   You should resume all medications you were taking prior to the surgery unless otherwise specified.   If you had fracture surgery, bony surgery like an osteotomy or fusion, or a surgery that requires bone healing, you are advised to take Vitamin D and Calcium to improve healing potential.  Vitamin D3 4000 units/day and Calcium 1200mg/day. These are over the counter medications so please pick them up at the pharmacy when you are picking up your prescriptions.    Activity:   Because of your recent foot surgery, your activity level will decrease. You will need to elevate your foot ABOVE the level of your heart for a minimum of four days. The length of time necessary for the swelling to go down, and for your wounds to heal properly depends greatly on your efforts here. Elevation is extremely important to avoid compromising the blood supply to your foot. Remember when your foot is down it will swell, which will increase pain and slow healing. Wiggle your toes frequently if possible.   If you go home with a regional block, (a type of anesthesia) the foot and leg will be numb. Think of ways to get into your house and around the house until the block wears off.   Keep in mind that it may be a legal issue if you drive while in a cast or splint, especially when the splint is on the right foot. You may call the Department of Motor Vehicles to schedule a road test if you have adaptive equipment applied to your car.   The amount of weight you are allowed to  bear on your foot will be written on your discharge sheet filled out at the time of surgery. The following is an explanation of the possibilities:      Heel-only weight bearing:   Usually, this order is given for use with a special shoe only, which will help you to put weight only on your heel. You may bear your body weight on your foot, as long as it is only borne on your heel.

## 2024-02-19 NOTE — ANESTHESIA POSTPROCEDURE EVALUATION
Post-Op Assessment Note    CV Status:  Stable  Pain Score: 0    Pain management: adequate       Mental Status:  Awake and somnolent   Hydration Status:  Stable   PONV Controlled:  None   Airway Patency:  Patent     Post Op Vitals Reviewed: Yes    No anethesia notable event occurred.    Staff: CRNA   Comments: Patient in PACU, airway patent, VSS. No c/o pain or nausea.            BP 99/54 (02/19/24 1240)    Temp (!) 97.4 °F (36.3 °C) (02/19/24 1240)    Pulse 67 (02/19/24 1240)   Resp 12 (02/19/24 1240)    SpO2   98% on RA

## 2024-02-19 NOTE — H&P
James R Lachman, M.D.  Attending, Orthopaedic Surgery  Foot and Ankle  [facility]        ORTHOPAEDIC FOOT AND ANKLE H+P     Assessment:   Midfoot arthritis Left foot           Plan:   The patient verbalized understanding of exam findings and treatment plan. We engaged in the shared decision-making process and treatment options were discussed at length with the patient. Surgical and conservative management discussed today along with risks and benefits.  Plan for left NC and 2+3rd TMT joint arthrodesis with 4th and 5th TMT joint interposition arthroplasties  Consent in chart  CONSENT FOR BONY PROCEDURES:   Patient understands that there is no guarantee that the surgery will relieve all of Her pain and also understands that there may be a prolonged course of protected weight-bearing status required which will restrict them from driving and other activities as discussed at today's visit. Patient recognizes that there are risks with surgery including bleeding, numbness, nerve irritation, wound complications, infection, continued pain, joint stiffness, malunion, nonunion, anesthetic complications, death, failure of procedure and possible need for further surgery. The patient understands that there is no guarantee that this surgery will relieve all of Her pain and symptoms.  Patient understands that there is no guarantee that they will return to full function after the procedure. Patient has provided informed consent for the procedure.                History of Present Illness:   Chief Complaint: Left midfoot arthritis  Julia Engelmann is a 63 y.o. female who is being seen for left midfoot arthritis. Patient has failed all conservative treatment measures.  Pain is localized at midfoot with minimal radiating and described as sharp and severe. Patient denies numbness, tingling or radicular pain.  Denies history of neuropathy.  Patient does not smoke, does not have diabetes and does not take blood  thinners.  Patient denies family history of anesthesia complications and has not had any complications with anesthesia.     Pain/symptom timing:  Worse during the day when active  Pain/symptom context:  Worse with activites and work  Pain/symptom modifying factors:  Rest makes better, activities make worse  Pain/symptom associated signs/symptoms: none    Prior treatment   NSAIDs Yes    Injections Yes   Bracing/Orthotics Yes   Physical Therapy Yes     Orthopedic Surgical History:   See below    Past Medical, Surgical and Social History:  Past Medical History:  has a past medical history of Arthritis, Celiac disease, Exercises daily, and PONV (postoperative nausea and vomiting).  Problem List: does not have any pertinent problems on file.  Past Surgical History:  has a past surgical history that includes Knee surgery (Right, 2015); Knee Arthroplasty (Left, 2014); FL guided needle plac bx/asp/inj (04/25/2023); FL guided needle plac bx/asp/inj (08/08/2023); Colonoscopy; EGD; and Dilation and curettage of uterus.  Family History: family history includes Diabetes in her father; Heart attack in her father; Hypertension in her father and mother; Throat cancer in her mother.  Social History:  reports that she has never smoked. She has never used smokeless tobacco. She reports current alcohol use. She reports that she does not use drugs.  Current Medications: Scheduled Meds:  Current Facility-Administered Medications   Medication Dose Route Frequency Provider Last Rate    bupivacaine liposomal  20 mL Infiltration Once Susu Mejia MD      cefazolin  1,000 mg Intravenous Once James R Lachman, MD      chlorhexidine   Topical Daily PRN James R Lachman, MD      chlorhexidine  15 mL Swish & Spit Once James R Lachman, MD      scopolamine  1 patch Transdermal Once Melvina Gaines CRNA      scopolamine  1 patch Transdermal Q72H Susu Mejia MD       Continuous Infusions:   PRN Meds:.  chlorhexidine    Allergies: is allergic to  "acyclovir, demerol [meperidine], gluten meal - food allergy, percocet [oxycodone-acetaminophen], and tetracycline.     Review of Systems:  General- denies fever/chills  HEENT- denies hearing loss or sore throat  Eyes- denies eye pain or visual disturbances, denies red eyes  Respiratory- denies cough or SOB  Cardio- denies chest pain or palpitations  GI- denies abdominal pain  Endocrine- denies urinary frequency  Urinary- denies pain with urination  Musculoskeletal- Negative except noted above  Skin- denies rashes or wounds  Neurological- denies dizziness or headache  Psychiatric- denies anxiety or difficulty concentrating    Physical Exam:   /62   Pulse 64   Temp 97.8 °F (36.6 °C) (Oral)   Resp 18   Ht 4' 11\" (1.499 m)   Wt 59 kg (130 lb)   SpO2 98%   BMI 26.26 kg/m²   General/Constitutional: No apparent distress: well-nourished and well developed.  Eyes: normal ocular motion  Cardio: RRR, Normal S1S2, No m/r/g  Lymphatic: No appreciable lymphadenopathy  Respiratory: Non-labored breathing, CTA b/l no w/c/r  Abdominal: Soft and nontender  Vascular: No edema, swelling or tenderness, except as noted in detailed exam.  Integumentary: No impressive skin lesions present, except as noted in detailed exam.  Neuro: No ataxia or tremors noted  Psych: Normal mood and affect, oriented to person, place and time. Appropriate affect.  Musculoskeletal: Normal, except as noted in detailed exam and in HPI.    Examination    Left    Gait Antalgic   Musculoskeletal Tender to palpation at dorsal midfoot    Skin Normal.      Nails Normal    Range of Motion  20 degrees dorsiflexion, 30 degrees plantarflexion  Subtalar motion: normal    Stability Stable    Muscle Strength 5/5 tibialis anterior  5/5 gastrocnemius-soleus  5/5 posterior tibialis  5/5 peroneal/eversion strength  5/5 EHL  5/5 FHL    Neurologic Normal    Sensation Intact to light touch throughout sural, saphenous, superficial peroneal, deep peroneal and " medial/lateral plantar nerve distributions.  [place]-Mango 5.07 filament (10g) testing deferred.    Cardiovascular Brisk capillary refill < 2 seconds,intact DP and PT pulses    Special Tests None      Imaging Studies:   None New        James R. Lachman, MD  Foot & Ankle Surgery   Department of Orthopaedic Surgery  [facility]      I personally performed the service.    James R. Lachman, MD

## 2024-02-19 NOTE — ANESTHESIA PROCEDURE NOTES
Peripheral Block    Patient location during procedure: holding area  Start time: 2/19/2024 10:24 AM  Reason for block: at surgeon's request and post-op pain management  Staffing  Performed by: Susu Mejia MD  Authorized by: Susu Mejia MD    Preanesthetic Checklist  Completed: patient identified, IV checked, site marked, risks and benefits discussed, surgical consent, monitors and equipment checked, pre-op evaluation and timeout performed  Peripheral Block  Patient position: supine  Prep: ChloraPrep  Patient monitoring: heart rate and continuous pulse oximetry  Block type: Adductor Canal  Laterality: left  Injection technique: single-shot  Procedures: ultrasound guided, Ultrasound guidance required for the procedure to increase accuracy and safety of medication placement and decrease risk of complications.  Ultrasound permanent image saved  Needle  Needle type: Tuohy   Needle gauge: 20 G  Needle length: 4 in  Needle localization: ultrasound guidance  Needle insertion depth: 2 cm  Assessment  Injection assessment: no symptoms of intraneural/intravenous injection, no paresthesia on injection, frequent aspiration, injected with ease, negative aspiration, negative for heart rate change, needle tip visualized at all times and incremental injection  Paresthesia pain: none  Post-procedure:  site cleaned  patient tolerated the procedure well with no immediate complications

## 2024-02-19 NOTE — ANESTHESIA PREPROCEDURE EVALUATION
Procedure:  Left 2nd and 3rd and naviculocuneiform joint fusions with 4th and 5th TMT joint interpositional arthroplasties (Left: Foot)    Relevant Problems   CARDIO   (+) HTN (hypertension)      MUSCULOSKELETAL   (+) Osteoarthritis of midfoot due to inflammatory arthritis        Physical Exam    Airway    Mallampati score: II  TM Distance: >3 FB  Neck ROM: full     Dental   Comment: None loose, No notable dental hx     Cardiovascular      Pulmonary      Other Findings  post-pubertal.      EKG (1/2024)  Sinus bradycardia with sinus arrhythmia  Otherwise normal ECG  No previous ECGs available      Anesthesia Plan  ASA Score- 2     Anesthesia Type- general with ASA Monitors.         Additional Monitors:     Airway Plan: LMA.    Comment: Npo after MN    Preop adductor and popliteal nerve blocks with exparel.       Plan Factors-Exercise tolerance (METS): >4 METS.Exercise comment: Exercises daily.    Chart reviewed.    Patient summary reviewed.    Patient is not a current smoker.              Induction- intravenous.    Postoperative Plan- Plan for postoperative opioid use. Planned trial extubation    Informed Consent- Anesthetic plan and risks discussed with patient.  I personally reviewed this patient with the CRNA. Discussed and agreed on the Anesthesia Plan with the CRNA..

## 2024-02-19 NOTE — ANESTHESIA PROCEDURE NOTES
Peripheral Block    Patient location during procedure: holding area  Start time: 2/19/2024 10:29 AM  Reason for block: at surgeon's request and post-op pain management  Staffing  Performed by: Susu Mejia MD  Authorized by: Susu Mejia MD    Preanesthetic Checklist  Completed: patient identified, IV checked, site marked, risks and benefits discussed, surgical consent, monitors and equipment checked, pre-op evaluation and timeout performed  Peripheral Block  Patient position: right lateral  Prep: ChloraPrep  Patient monitoring: heart rate and continuous pulse oximetry  Block type: Popliteal  Laterality: left  Injection technique: single-shot  Procedures: ultrasound guided, Ultrasound guidance required for the procedure to increase accuracy and safety of medication placement and decrease risk of complications.  Ultrasound permanent image saved  Needle  Needle type: Tuohy   Needle gauge: 20 G  Needle length: 4 in  Needle localization: ultrasound guidance  Needle insertion depth: 2 cm  Assessment  Injection assessment: negative aspiration, negative for heart rate change, no symptoms of intraneural/intravenous injection, no paresthesia on injection, injected with ease, needle tip visualized at all times, incremental injection and frequent aspiration  Paresthesia pain: none  Post-procedure:  site cleaned  patient tolerated the procedure well with no immediate complications

## 2024-03-12 ENCOUNTER — OFFICE VISIT (OUTPATIENT)
Dept: OBGYN CLINIC | Facility: CLINIC | Age: 64
End: 2024-03-12

## 2024-03-12 VITALS — HEIGHT: 60 IN | BODY MASS INDEX: 25.52 KG/M2 | WEIGHT: 130 LBS

## 2024-03-12 DIAGNOSIS — M19.279 OSTEOARTHRITIS OF MIDFOOT DUE TO INFLAMMATORY ARTHRITIS: Primary | ICD-10-CM

## 2024-03-12 DIAGNOSIS — M19.90 OSTEOARTHRITIS OF MIDFOOT DUE TO INFLAMMATORY ARTHRITIS: Primary | ICD-10-CM

## 2024-03-12 PROCEDURE — 99024 POSTOP FOLLOW-UP VISIT: CPT | Performed by: ORTHOPAEDIC SURGERY

## 2024-03-12 NOTE — PATIENT INSTRUCTIONS
"Nonweightbearing     Continue aspirin/lovenox to prevent blood clots  Purchase a compression stocking (Knee high, 20-30mm Hg) to be worn at all times while awake for your next visit when the cast comes off.  Recommend taking the following supplements: Vitamin D 4000 units per day and Calcium 1200 mg per day. This will help with bone healing.    Care of Casts and Splints    Casts and splints support and protect injured bones and soft tissue. When you break a bone, your doctor will put the pieces back together in the right position. Casts and splints hold the bones in place while they heal. They also reduce pain, swelling, and muscle spasm.    In some cases, splints and casts are applied following surgery.    Splints or \"half-casts\" provide less support than casts. However, splints can be adjusted to accommodate swelling from injuries easier than enclosed casts. Your doctor will decide which type of support is best for you.    Types of Splints and Casts  Casts are custom-made. They must fit the shape of your injured limb correctly to provide the best support. Casts can be made of plaster or fiberglass -- a plastic that can be shaped.  Splints or half-casts can also be custom-made, especially if an exact fit is necessary. Other times, a ready-made splint will be used. These off-the-shelf splints are made in a variety of shapes and sizes, and are much easier and faster to use. They have Velcro straps which make the splints easy to put on, take off, and adjust.    Materials  Fiberglass or plaster materials form the hard supportive layer in splints and casts.  Fiberglass is lighter in weight and stronger than plaster. In addition, x-rays can \"see through\" fiberglass better than through plaster. This is important because your doctor will probably schedule additional x-rays after your splint or cast has been applied. X-rays can show whether the bones are healing well or have moved out of place.  Plaster is less expensive " "than fiberglass and shapes better than fiberglass for some uses.    Application  Both fiberglass and plaster splints and casts use padding, usually cotton, as a protective layer next to the skin. Both materials come in strips or rolls which are dipped in water and applied over the padding covering the injured area.  The splint or cast must fit the shape of the injured arm or leg correctly to provide the best possible support. Generally, the splint or cast also covers the joint above and below the broken bone.  In many cases, a splint is applied to a fresh injury first. As swelling subsides, a full cast may replace the splint.  Sometimes, it may be necessary to replace a cast as swelling goes down and the cast gets \"too big.\" As a fracture heals, the cast may be replaced by a splint to make it easier to perform physical therapy exercises.        Apply ice to the splint or cast and elevate your leg to reduce swelling.  Warning Signs  Swelling can create a lot of pressure under your cast. This can lead to problems. If you experience any of the following symptoms, contact your doctor's office immediately for advice.  Increased pain and the feeling that the splint or cast is too tight. This may be caused by swelling.   Numbness and tingling in your hand or foot. This may be caused by too much pressure on the nerves.   Burning and stinging. This may be caused by too much pressure on the skin.   Excessive swelling below the cast. This may mean the cast is slowing your blood circulation.   Loss of active movement of toes or fingers. This requires an urgent evaluation by your doctor.      Getting Used to a Splint or Cast  Swelling due to your injury may cause pressure in your splint or cast for the first 48 to 72 hours. This may cause your injured arm or leg to feel snug or tight in the splint or cast. If you have a splint, your doctor will show you how to adjust it to accommodate the swelling.  It is very important to keep " "the swelling down. This will lessen pain and help your injury heal. To help reduce swelling:  Elevate. It is very important to elevate your injured arm or leg for the first 24 to 72 hours. Prop your injured arm or leg up above your heart by putting it on pillows or some other support. You will have to recline if the splint or cast is on your leg. Elevation allows clear fluid and blood to drain \"downhill\" to your heart.   Exercise. Move your uninjured, but swollen fingers or toes gently and often. Moving them often will prevent stiffness.   Ice. Apply ice to the splint or cast. Place the ice in a dry plastic bag or ice pack and loosely wrap it around the splint or cast at the level of the injury. Ice that is packed in a rigid container and touches the cast at only one point will not be effective.  Taking Care of Your Splint or Cast  Your doctor will explain any restrictions on using your injured arm or leg while it is healing. You must follow your doctor's instructions carefully to make sure your bone heals properly. The following information provides general guidelines only, and is not a substitute for your doctor's advice.  After you have adjusted to your splint or cast for a few days, it is important to keep it in good condition. This will help your recovery.  Keep your splint or cast dry. Moisture weakens plaster and damp padding next to the skin can cause irritation. Use two layers of plastic or purchase waterproof shields to keep your splint or cast dry while you shower or bathe. Even if the cast is covered, do not submerge it or hold it under running water. A small pinhole in the cast cover can cause the injury to get soaked.   Walking casts. Do not walk on a \"walking cast\" until it is completely dry and hard. It takes about one hour for fiberglass, and two to three days for plaster to become hard enough to walk on.   Avoid dirt. Keep dirt, sand, and powder away from the inside of your splint or cast. " "  Padding. Do not pull out the padding from your splint or cast.   Itching. Do not stick objects such as coat hangers inside the splint or cast to scratch itching skin. Do not apply powders or deodorants to itching skin. If itching persists, contact your doctor.   Trimming. Do not break off rough edges of the cast or trim the cast before asking your doctor.   Skin. Inspect the skin around the cast. If your skin becomes red or raw around the cast, contact your doctor.   Inspect the cast regularly. If it becomes cracked or develops soft spots, contact your doctor's office.  Use common sense. You have a serious injury and you must protect your cast from damage so it can protect your injury while it heals.  After the initial swelling has subsided, proper splint or cast support will usually allow you to continue your daily activities with a minimum of inconvenience.  Cast Removal  Never remove the cast yourself. You may cut your skin or prevent proper healing of your injury.  Your doctor will use a cast saw to remove your cast. The saw vibrates, but does not rotate. If the blade of the saw touches the padding inside the hard shell of the cast, the padding will vibrate with the blade and will protect your skin. Cast saws make noise and may feel \"hot\" from friction, but will not harm you -- \"their bark is worse than their bite.\"  If you do feel pain while the cast is being removed, let your doctor or an assistant know and they will be able to make adjustments.  The saw vibrates but does not rotate. Cast saws make noise but will not harm you.      "

## 2024-03-12 NOTE — PROGRESS NOTES
James R Lachman, M.D.  Attending, Orthopaedic Surgery  Foot and Ankle  [facility]      ORTHOPAEDIC FOOT AND ANKLE POST-OP VISIT     Procedure:      Left 2nd and 3rd TMT and naviculocuneiform joint fusions with 4th and 5th TMT joint interpositional arthroplasties        Date of surgery:   2/19/2024    PLAN  1. Weightbearing Status - NWB operative extremity in short leg cast  2. DVT prophylaxis - ASA 325mg BID  3.  Ice elevation for pain control  to elevate 23 hrs/day getting up 1x per hour to prevent a blood clot  4. Pain control - OTC pain medication  5. RTC in 3 weeks  6. Xrays needed next visit - Yes weightbearing left foot 3 view    History of Present Illness:   Chief Complaint:   Chief Complaint   Patient presents with    Post-op     3 week post up splint off and stiches out.        Julia Engelmann is a 63 y.o. female who is being seen for 3 week post-operative visit for the above procedure. Pain is well controlled and the patient has successfully transitioned to OTC pain medicines.  she is taking ASA 325mg BID for DVT prophylaxis. Patient has been NWB in a Splint      Review of Systems:  General- denies fever/chills  Respiratory- denies cough or SOB  Cardio- denies chest pain or palpitations  GI- denies abdominal pain  Musculoskeletal- Negative except noted above  Skin- denies rashes or wounds    Physical Exam:   Ht 5' (1.524 m)   Wt 59 kg (130 lb)   BMI 25.39 kg/m²   General/Constitutional: No apparent distress: well-nourished and well developed.  Eyes: normal ocular motion  Lymphatic: No appreciable lymphadenopathy  Respiratory: Non-labored breathing  Vascular: No edema, swelling or tenderness, except as noted in detailed exam.  Integumentary: No impressive skin lesions present, except as noted in detailed exam.  Neuro: No ataxia or tremors noted  Psych: Normal mood and affect, oriented to person, place and time. Appropriate affect.  Musculoskeletal: Normal, except as noted in  detailed exam and in HPI.    Examination    left        Incision Clean, dry, intact  Sutures Removed this visit    Ecchymosis none    Swelling mild    Sensation Intact to light touch throughout sural, saphenous, superficial peroneal, deep peroneal and medial/lateral plantar nerve distributions.  [place]-Mango 5.07 filament (10g) testing deferred.    Cardiovascular Brisk capillary refill < 2 seconds,intact DP and PT pulses    Special Tests None      Imaging Studies:   No new imagestoday      Scribe Attestation      I,:   am acting as a scribe while in the presence of the attending physician.:       I,:   personally performed the services described in this documentation    as scribed in my presence.:                James R. Lachman, MD  Foot & Ankle Surgery   Department of Orthopaedic Surgery  [facility]      I personally performed the service.    James R. Lachman, MD

## 2024-04-05 ENCOUNTER — OFFICE VISIT (OUTPATIENT)
Dept: OBGYN CLINIC | Facility: CLINIC | Age: 64
End: 2024-04-05

## 2024-04-05 ENCOUNTER — APPOINTMENT (OUTPATIENT)
Dept: RADIOLOGY | Facility: CLINIC | Age: 64
End: 2024-04-05
Payer: COMMERCIAL

## 2024-04-05 VITALS — BODY MASS INDEX: 25.4 KG/M2 | HEIGHT: 59 IN | WEIGHT: 126 LBS

## 2024-04-05 DIAGNOSIS — M19.90 OSTEOARTHRITIS OF MIDFOOT DUE TO INFLAMMATORY ARTHRITIS: Primary | ICD-10-CM

## 2024-04-05 DIAGNOSIS — M19.90 OSTEOARTHRITIS OF MIDFOOT DUE TO INFLAMMATORY ARTHRITIS: ICD-10-CM

## 2024-04-05 DIAGNOSIS — M19.279 OSTEOARTHRITIS OF MIDFOOT DUE TO INFLAMMATORY ARTHRITIS: ICD-10-CM

## 2024-04-05 DIAGNOSIS — Z01.89 ENCOUNTER FOR LOWER EXTREMITY COMPARISON IMAGING STUDY: ICD-10-CM

## 2024-04-05 DIAGNOSIS — M19.279 OSTEOARTHRITIS OF MIDFOOT DUE TO INFLAMMATORY ARTHRITIS: Primary | ICD-10-CM

## 2024-04-05 PROCEDURE — 99024 POSTOP FOLLOW-UP VISIT: CPT | Performed by: ORTHOPAEDIC SURGERY

## 2024-04-05 PROCEDURE — 73630 X-RAY EXAM OF FOOT: CPT

## 2024-04-05 PROCEDURE — 73620 X-RAY EXAM OF FOOT: CPT

## 2024-04-05 NOTE — PROGRESS NOTES
"      James R Lachman, M.D.  Attending, Orthopaedic Surgery  Foot and Ankle  [facility]      ORTHOPAEDIC FOOT AND ANKLE POST-OP VISIT     Procedure:      Left  2nd and 3rd TMT and naviculocuneiform joint fusions with 4th and 5th TMT joint interpositional arthroplasties     Date of surgery:   2/19/2024    PLAN  1. Weightbearing Status - PWB operative extremity in cam boot  2. DVT prophylaxis - Completed  3. Begin PT/HEP as directed  4. Pain control - OTC pain medication  5. RTC in 6 week(s)  6. Xrays needed next visit - Yes weightbearing foot    History of Present Illness:   Chief Complaint:   Chief Complaint   Patient presents with    Post-op     6 week post. Cast coming off     Julia Engelmann is a 63 y.o. female who is being seen for 6 week post-operative visit for the above procedure. Pain is well controlled and the patient has successfully transitioned to OTC pain medicines.  she is taking ASA 325mg BID for DVT prophylaxis. Patient has been NWB in a Short leg cast    Review of Systems:  General- denies fever/chills  Respiratory- denies cough or SOB  Cardio- denies chest pain or palpitations  GI- denies abdominal pain  Musculoskeletal- Negative except noted above  Skin- denies rashes or wounds    Physical Exam:   Ht 4' 11\" (1.499 m)   Wt 57.2 kg (126 lb)   BMI 25.45 kg/m²   General/Constitutional: No apparent distress: well-nourished and well developed.  Eyes: normal ocular motion  Lymphatic: No appreciable lymphadenopathy  Respiratory: Non-labored breathing  Vascular: No edema, swelling or tenderness, except as noted in detailed exam.  Integumentary: No impressive skin lesions present, except as noted in detailed exam.  Neuro: No ataxia or tremors noted  Psych: Normal mood and affect, oriented to person, place and time. Appropriate affect.  Musculoskeletal: Normal, except as noted in detailed exam and in HPI.    Examination    left        Incision Clean, dry, intact  Sutures Previously " removed. 1 remaining suture removed today     Ecchymosis none    Swelling mild    Sensation Intact to light touch throughout sural, saphenous, superficial peroneal, deep peroneal and medial/lateral plantar nerve distributions.  [place]-Mango 5.07 filament (10g) testing deferred.    Cardiovascular Brisk capillary refill < 2 seconds,intact DP and PT pulses    Special Tests None      Imaging Studies:   3 views of the left foot were taken, reviewed and interpreted independently that demonstrate hardware in expected position without signs of failure or loosening. Reviewed by me personally.      Scribe Attestation      I,:  Cheyenne Briceno PA-C am acting as a scribe while in the presence of the attending physician.:       I,:  James R Lachman, MD personally performed the services described in this documentation    as scribed in my presence.:            James R. Lachman, MD  Foot & Ankle Surgery   Department of Orthopaedic Surgery  [facility]      I personally performed the service.    James R. Lachman, MD

## 2024-04-05 NOTE — PATIENT INSTRUCTIONS
4 days of partial weight bearing 50%  Then, 4 days of partial weight bearing 75%  Then, 4 days of partial weight bearing 90%  Then full weight bearing in the boot and wean your crutches/rolling walker.    Then 2 weeks of weightbearing as tolerated in the CAM boot.    You may begin weaning your boot and transitioning to a sneaker (5/1). It is important to do this gradually to avoid aggravating the healing process.    5/1, you may come out of the boot into a sneaker for 2 hours.  2. 5/2, you may come out of the boot into a sneaker for 4 hours,  3. The next day, you may come out of the boot into a sneaker for 6 hours.  4. Continue this (adding 2 hours per day) as you tolerate. For example, if you do 6 hours out of the boot into a sneaker and your foot swells more than usual at night and it is difficult to control the discomfort, do not advance to 8 hours the next day, stay at 6 hours until you are able to tolerate it.    It is essential to follow this protocol and not modify this.  No matter when you begin weaning the boot, it will be difficult and there will be swelling and soreness.  If you spend more time than is recommended in the boot, this process becomes longer and more painful.    Elevation, Ice and tylenol and staying off of it at night will be important to aide in this transition out of the boot. Swelling and soreness are normal as you begin to do more with the injured leg.    May DC aspirin/lovenox, no longer needed.  May shower, do not soak in a tub/pool/ocean/etc for another 1 weeks.  Begin PT  Scar massage- pea sized amount of lotion, massage into scar for 5 minutes each day.  Compression stocking (Knee high, 20-30mm Hg) to be worn at all times while awake.  Recommend taking the following supplements: Vitamin D3- 4000 units per day and Calcium 1200 mg per day. This will help with bone healing.

## 2024-04-09 ENCOUNTER — EVALUATION (OUTPATIENT)
Dept: PHYSICAL THERAPY | Facility: CLINIC | Age: 64
End: 2024-04-09
Payer: COMMERCIAL

## 2024-04-09 DIAGNOSIS — M19.279 OSTEOARTHRITIS OF MIDFOOT DUE TO INFLAMMATORY ARTHRITIS: ICD-10-CM

## 2024-04-09 DIAGNOSIS — Q70.22 FUSION OF TOES OF LEFT FOOT: Primary | ICD-10-CM

## 2024-04-09 DIAGNOSIS — M19.90 OSTEOARTHRITIS OF MIDFOOT DUE TO INFLAMMATORY ARTHRITIS: ICD-10-CM

## 2024-04-09 PROCEDURE — 97140 MANUAL THERAPY 1/> REGIONS: CPT | Performed by: PHYSICAL THERAPIST

## 2024-04-09 PROCEDURE — 97161 PT EVAL LOW COMPLEX 20 MIN: CPT | Performed by: PHYSICAL THERAPIST

## 2024-04-09 NOTE — PROGRESS NOTES
PT Evaluation     Today's date: 2024  Patient name: Julia Engelmann  : 1960  MRN: [id number]  Referring provider: Lachman, James R, MD  Dx:   Encounter Diagnosis     ICD-10-CM    1. Fusion of toes of left foot  Q70.22       2. Osteoarthritis of midfoot due to inflammatory arthritis  M19.279 Ambulatory Referral to Physical Therapy    M19.90                      Assessment  Assessment details: Julia Engelmann is a 63 y.o. female presenting to outpatient physical therapy at [facility] with complaints of L foot pain.  She presents with decreased range of motion, decreased strength, limited flexibility, altered gait pattern, poor balance, decreased tolerance to activity and decreased functional mobility following fusion of toes of left foot on 24 due to osteoarthritis of midfoot due to inflammatory arthritis.  Mod increase in mobility post 1st manual tx today.  She would benefit from skilled PT services in order to address these deficits and reach maximum level of function.  Thank you for the referral!  Impairments: abnormal gait, abnormal or restricted ROM, activity intolerance, impaired balance, impaired physical strength, lacks appropriate home exercise program and pain with function  Barriers to therapy: None  Understanding of Dx/Px/POC: excellent  Goals  ST.  Independent with HEP in 2 weeks  2.  Increase L ankle AROM by 5 degrees all motions in 3 weeks     LT.  Achieve FOTO score of 53/100 in 12 weeks   2.  Able to ambulate x 20 min on all surfaces for exercise and ADLs in 12 weeks  3.  Strength all L ankle motions = 5/5, L foot intrinsics = 4-/5 in 12 weeks  4.  Good L LE balance in 12 weeks  5.  Normalized gait pattern in 12 weeks    Plan  Patient would benefit from: skilled PT and PT eval  Planned modality interventions: cryotherapy and thermotherapy: hydrocollator packs  Planned therapy interventions: ADL retraining, balance/weight bearing training, flexibility, functional ROM  exercises, home exercise program, joint mobilization, manual therapy, neuromuscular re-education, strengthening, stretching, therapeutic activities, therapeutic exercise and gait training  Frequency: 2x week  Duration in weeks: 12  Treatment plan discussed with: patient        Subjective Evaluation    History of Present Illness  Mechanism of injury: On 24 pt had L 2nd and 3rd and naviculocuneiform joint fusions with 4th and 5th TMT joint interpositional arthroplasties due to long hx of foot pain x 20 years.  On 24 she began PWB L LE vs NWB status since surgery.  She has hx of R TKA in  and L TKA in .  Retired pharmacy tech.  Unable to ride a bike or descend steps due to limited R knee flex ROM.  Using B crutches for ambulation with 50% L LE WB.  Will increase to 75% WB on 24 and 90% for 4 days then 100% with weaning to sneaker.               Recurrent probem    Quality of life: good    Patient Goals  Patient goals for therapy: increased motion, improved balance, decreased pain, decreased edema, increased strength, independence with ADLs/IADLs and return to sport/leisure activities    Pain  Current pain ratin  At best pain ratin  At worst pain rating: 3  Quality: dull ache and tight  Relieving factors: ice and medications  Aggravating factors: walking, standing and stair climbing  Progression: improved    Social Support  Steps to enter house: yes  Stairs in house: no   Lives in: multiple-level home  Lives with: spouse    Employment status: not working  Treatments  Previous treatment: medication and immobilization  Current treatment: immobilization and medication        Objective     Observations   Left Ankle/Foot   Positive for atrophy and effusion.     Additional Observation Details  3 cm girth loss L gastroc vs R.     Palpation     Additional Palpation Details  Mod tightness L plantar fascia.    Tenderness   Left Ankle/Foot   Tenderness in the plantar fascia.     Neurological Testing      Sensation     Ankle/Foot   Left Ankle/Foot   Diminished: light touch    Right Ankle/Foot   Intact: light touch     Comments   Left light touch: 2nd + 3rd L toes.     Active Range of Motion   Left Ankle/Foot   Dorsiflexion (ke): -11 degrees   Plantar flexion: 20 degrees   Inversion: 8 degrees   Eversion: 4 degrees     Right Ankle/Foot   Normal active range of motion  Dorsiflexion (ke): 7 degrees   Plantar flexion: 45 degrees   Inversion: 34 degrees   Eversion: 18 degrees     Passive Range of Motion   Left Ankle/Foot    Dorsiflexion (ke): -8 degrees   Plantar flexion: 24 degrees   Inversion: 11 degrees   Eversion: 6 degrees     Strength/Myotome Testing     Left Ankle/Foot   Dorsiflexion: 4-  Plantar flexion: 2+  Inversion: 3+  Eversion: 3+    Right Ankle/Foot   Normal strength    Additional Strength Details  L foot intrinsics = 2/5    Ambulation   Weight-Bearing Status   Weight-Bearing Status (Left): weight-bearing as tolerated   Weight-Bearing Status (Right): full weight-bearing    Assistive device used: none    Additional Weight-Bearing Status Details  CAM boot L foot.     Ambulation: Level Surfaces   Ambulation without assistive device: independent    Ambulation: Stairs   Ascend stairs: independent  Pattern: non-reciprocal  Railings: one rail  Descend stairs: independent  Pattern: non-reciprocal  Railings: one rail  Curbs: unable    Observational Gait   Decreased walking speed and left stance time.   Left stance time comments: severe    Comments   Poor L LE balance vs excellent R.         POC EXPIRES On:  7/2/24  PRECAUTIONS:  PWB L Foot  CO-MORBIDITES:  B TKA  PERSONAL FACTORS:  None      Manuals HEP 4/9           L plantar fascia STM  6'           PROM L ankle all motions  4'                                     Neuro Re-Ed                                                                                                Ther Ex    Toe flexion L 4/9 20           S/L L ankle inv/ev 4/9 20 ea           Strap stretch  "into DF L 4/9 20\" 5                                                                            Ther Activity                              Gait Training                              Modalities                                      "

## 2024-04-11 ENCOUNTER — OFFICE VISIT (OUTPATIENT)
Dept: PODIATRY | Facility: CLINIC | Age: 64
End: 2024-04-11

## 2024-04-11 VITALS
DIASTOLIC BLOOD PRESSURE: 70 MMHG | WEIGHT: 123 LBS | HEART RATE: 70 BPM | HEIGHT: 59 IN | BODY MASS INDEX: 24.8 KG/M2 | SYSTOLIC BLOOD PRESSURE: 140 MMHG

## 2024-04-11 DIAGNOSIS — M79.672 PAIN IN LEFT FOOT: ICD-10-CM

## 2024-04-11 DIAGNOSIS — Q82.8 POROKERATOSIS: Primary | ICD-10-CM

## 2024-04-11 DIAGNOSIS — M19.072 OSTEOARTHRITIS OF LEFT ANKLE OR FOOT: ICD-10-CM

## 2024-04-11 NOTE — PROGRESS NOTES
Assessment/Plan:   Continue with padding left lateral midfoot lesion as it seems to be helping but upon completion of midfoot arthrodesis healing she will probably need to be casted again for fabrication of a new custom orthotic.  Custom orthotic will help to give her midfoot more structural support as it heals.  Continue with proper supportive shoe gear.  Follow-up in approximately 3 months.     Diagnoses and all orders for this visit:    Porokeratosis    Pain in left foot    Osteoarthritis of left ankle or foot          Subjective:     Patient ID: Julia Engelmann is a 63 y.o. female.    4/11/2024: 63-year-old female seen for follow-up painful lesion left foot.  Patient is currently status post arthrodesis left midfoot first navicular cuneiform joint and second and third metatarsocuneiform joints.  She is not fully weightbearing yet.    1/8/2024: 63-year-old female seen today for follow-up reports the painful lesion has come back again and is causing her significant pain/discomfort that makes her limp.  Reports her arthritis is about the same which is fairly constant and she is now scheduled to have a midfoot fusion in February 2024 to address her arthritic issues.    10/6/2023: 63-year-old female seen today for follow-up reports her left midfoot arthritis seems to be doing okay and continues to take Voltaren twice a day.  Has not been needing pain meds at night near as much.  Secondary concern of painful lesion plantar aspect of her left foot laterally which seems to be giving her more trouble.    6/23/2023: 62-year-old female presents concerned with continued breakthrough pain of her left midfoot.  Patient reports that she continues to take Voltaren twice a day does seem to help throughout the day but at night when it is time to sleep she has aching pain that does not let up.  Pain is localized to the same left midfoot area.  Chronic lesion on the plantar aspect of the left foot remains unchanged.  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        Review of Systems   Musculoskeletal:  Positive for arthralgias (Left midfoot).   Skin:         Painful lesion has diminished in size left.  Healed satisfactorily         Objective:     Physical Exam  Constitutional:       Appearance: Normal appearance.   HENT:      Head: Normocephalic.      Right Ear: External ear normal.      Left Ear: External ear normal.   Eyes:      Pupils: Pupils are equal, round, and reactive to light.   Cardiovascular:      Rate and Rhythm: Normal rate.      Pulses:           Dorsalis pedis pulses are 2+ on the right side and 2+ on the left side.        Posterior tibial pulses are 1+ on the right side and 1+ on the left side.   Pulmonary:      Effort: Pulmonary effort is normal.   Musculoskeletal:         General: Swelling and tenderness present.      Cervical back: Normal range of motion.      Comments: Generalized midfoot pain primarily postoperative discomfort at this point with healing arthrodeses.  Anticipate less arthritic pain upon completion of healing within the next 6 months.   Feet:      Right foot:      Protective Sensation: 10 sites tested.  10 sites sensed.      Skin integrity: Skin integrity normal.      Left foot:      Protective Sensation: 10 sites tested.  10 sites sensed.      Skin integrity: Callus present.   Skin:     General: Skin is warm and dry.      Capillary Refill: Capillary refill takes 2 to 3 seconds.      Findings: Lesion present.      Comments: Left foot: Lateral plantar midfoot nucleated hyperkeratotic lesion consistent with a porokeratosis has diminished in size and is 50% less tender than it usually has been in the past.   Neurological:      General: No focal deficit present.      Mental Status: She is alert.   Psychiatric:         Mood and Affect: Mood normal.

## 2024-04-12 ENCOUNTER — OFFICE VISIT (OUTPATIENT)
Dept: PHYSICAL THERAPY | Facility: CLINIC | Age: 64
End: 2024-04-12
Payer: COMMERCIAL

## 2024-04-12 DIAGNOSIS — M19.279 OSTEOARTHRITIS OF MIDFOOT DUE TO INFLAMMATORY ARTHRITIS: Primary | ICD-10-CM

## 2024-04-12 DIAGNOSIS — Q70.22 FUSION OF TOES OF LEFT FOOT: ICD-10-CM

## 2024-04-12 DIAGNOSIS — M19.90 OSTEOARTHRITIS OF MIDFOOT DUE TO INFLAMMATORY ARTHRITIS: Primary | ICD-10-CM

## 2024-04-12 PROCEDURE — 97140 MANUAL THERAPY 1/> REGIONS: CPT | Performed by: PHYSICAL THERAPIST

## 2024-04-12 PROCEDURE — 97110 THERAPEUTIC EXERCISES: CPT | Performed by: PHYSICAL THERAPIST

## 2024-04-12 NOTE — PROGRESS NOTES
"Daily Note     Today's date: 2024  Patient name: Julia Engelmann  : 1960  MRN: [id number]  Referring provider: Lachman, James R, MD  Dx:   Encounter Diagnosis     ICD-10-CM    1. 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 Fusion of toes of left foot  Q70.22                      Subjective:  Pt reports feeling pretty good.  She walked a lot yesterday and was a little sore but feeling better now.       Objective:  See treatment diary below      Assessment:  Pt presented to outpatient physical therapy at [facility] with complaints of L foot pain.  She presents with decreased range of motion, decreased strength, limited flexibility, altered gait pattern, poor balance, decreased tolerance to activity and decreased functional mobility following fusion of toes of left foot on 24 due to osteoarthritis of midfoot due to inflammatory arthritis.  Mod increase in foot STM and ROM today with less pain.  She will continue to benefit from skilled PT services in order to address these deficits and reach maximum level of function.      Plan:  Progress out of CAM boot next week to WB exercises including gait training and balance.       POC EXPIRES On:  24  PRECAUTIONS:  PWB L Foot  CO-MORBIDITES:  B TKA  PERSONAL FACTORS:  None      Manuals HEP           L plantar fascia STM  6' 12'          PROM L ankle all motions  4 8'                                    Neuro Re-Ed                                                                                                Ther Ex    Toe flexion L  20 30          S/L L ankle inv/ev  20 ea 30 ea          Strap stretch into DF L  20\" 5 20\" 8          Seated L ankle DF/PF   20 ea                                                              Ther Activity                              Gait Training                              Modalities                                         "

## 2024-04-16 ENCOUNTER — OFFICE VISIT (OUTPATIENT)
Dept: PHYSICAL THERAPY | Facility: CLINIC | Age: 64
End: 2024-04-16
Payer: COMMERCIAL

## 2024-04-16 DIAGNOSIS — M19.90 OSTEOARTHRITIS OF MIDFOOT DUE TO INFLAMMATORY ARTHRITIS: ICD-10-CM

## 2024-04-16 DIAGNOSIS — M19.279 OSTEOARTHRITIS OF MIDFOOT DUE TO INFLAMMATORY ARTHRITIS: ICD-10-CM

## 2024-04-16 DIAGNOSIS — Q70.22 FUSION OF TOES OF LEFT FOOT: Primary | ICD-10-CM

## 2024-04-16 PROCEDURE — 97140 MANUAL THERAPY 1/> REGIONS: CPT | Performed by: PHYSICAL THERAPIST

## 2024-04-16 PROCEDURE — 97110 THERAPEUTIC EXERCISES: CPT | Performed by: PHYSICAL THERAPIST

## 2024-04-16 NOTE — PROGRESS NOTES
"Daily Note     Today's date: 2024  Patient name: Julia Engelmann  : 1960  MRN: [id number]  Referring provider: Lachman, James R, MD  Dx:   Encounter Diagnosis     ICD-10-CM    1. Fusion of toes of left foot  Q70.22       2. 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  Walking a little better without her CAM boot for short distances.       Objective:  See treatment diary below      Assessment:  Pt presented to outpatient physical therapy at [facility] with complaints of L foot pain.  She presents with decreased range of motion, decreased strength, limited flexibility, altered gait pattern, poor balance, decreased tolerance to activity and decreased functional mobility following fusion of toes of left foot on 24 due to osteoarthritis of midfoot due to inflammatory arthritis.  Mod increase in foot STM and ROM today with less pain.  Good tolerance to added WB exercises today with little pain.  She will continue to benefit from skilled PT services in order to address these deficits and reach maximum level of function.      Plan:  Progress to elliptical, more WB exercises including gait training and L LE balance.       POC EXPIRES On:  24  PRECAUTIONS:  PWB L Foot - No bike due to limited knee ROM  CO-MORBIDITES:  B TKA  PERSONAL FACTORS:  None      Manuals HEP          L plantar fascia STM  6' 12' 8'         PROM L ankle all motions  4' 8' 4'                                   Neuro Re-Ed                                                                                                Ther Ex    Toe flexion L  20 30 30         S/L L ankle inv/ev  20 ea 30 ea 30 ea         Strap stretch into DF L 20\" 5 20\" 8 20\" 8         Seated L ankle DF/PF   20 ea 20 ea         B calf raises    20         Standing marching " L/R 4/16   20 ea         Standing hip abd L/R 4/16 20 ea                      Ther Activity                              Gait Training                              Modalities

## 2024-04-19 ENCOUNTER — OFFICE VISIT (OUTPATIENT)
Dept: PHYSICAL THERAPY | Facility: CLINIC | Age: 64
End: 2024-04-19
Payer: COMMERCIAL

## 2024-04-19 DIAGNOSIS — M19.279 OSTEOARTHRITIS OF MIDFOOT DUE TO INFLAMMATORY ARTHRITIS: ICD-10-CM

## 2024-04-19 DIAGNOSIS — Q70.22 FUSION OF TOES OF LEFT FOOT: Primary | ICD-10-CM

## 2024-04-19 DIAGNOSIS — M19.90 OSTEOARTHRITIS OF MIDFOOT DUE TO INFLAMMATORY ARTHRITIS: ICD-10-CM

## 2024-04-19 PROCEDURE — 97110 THERAPEUTIC EXERCISES: CPT | Performed by: PHYSICAL THERAPIST

## 2024-04-19 PROCEDURE — 97140 MANUAL THERAPY 1/> REGIONS: CPT | Performed by: PHYSICAL THERAPIST

## 2024-04-19 NOTE — PROGRESS NOTES
"Daily Note     Today's date: 2024  Patient name: Julia Engelmann  : 1960  MRN: [id number]  Referring provider: Lachman, James R, MD  Dx:   Encounter Diagnosis     ICD-10-CM    1. Fusion of toes of left foot  Q70.22       2. 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  Plantar aspect of foot is OK.         Objective:  See treatment diary below      Assessment:  Pt presented to outpatient physical therapy at [facility] with complaints of L foot pain.  She presents with decreased range of motion, decreased strength, limited flexibility, altered gait pattern, poor balance, decreased tolerance to activity and decreased functional mobility following fusion of toes of left foot on 24 due to osteoarthritis of midfoot due to inflammatory arthritis.  Significant increase in foot STM and ROM today with slightly less pain.  Good tolerance to added WB exercises today with little pain.  Gait was improved after cueing to decrease impact on R foot but still need work.  She will continue to benefit from skilled PT services in order to address these deficits and reach maximum level of function.      Plan:  Progress to elliptical, more WB exercises including gait training and L LE balance.       POC EXPIRES On:  24  PRECAUTIONS:  PWB L Foot - No bike due to limited knee ROM  CO-MORBIDITES:  B TKA  PERSONAL FACTORS:  None      Manuals HEP         L plantar fascia STM  6' 12' 8' 6'        PROM L ankle all motions  4' 8' 4' 7'        STM L ankle     2'                     Neuro Re-Ed     Tandem walk 12' FWD     5 laps        Sidestepping on foam 12'     NV        SLS L                                                                 Ther Ex    Toe flexion L  20 30 30 30        S/L L ankle inv/ev  20 ea 30 ea 30 ea 30 ea        Strap stretch into DF L  20\" 5 20\" 8 20\" " "8 D/C        Seated L ankle DF/PF 4/12  20 ea 20 ea 20 ea        B calf raises 4/16 20 20        Standing marching L/R 4/16   20 ea 20 ea        Standing hip abd L/R 4/16   20 ea 20 ea        Standing L gastroc stretch 4/19 20\" 3         Standing L soleus stretch 4/19 20\" 3        Elliptical     NV        Ther Activity                              Gait Training    No AD     5'                     Modalities                                         "

## 2024-04-22 ENCOUNTER — OFFICE VISIT (OUTPATIENT)
Dept: PHYSICAL THERAPY | Facility: CLINIC | Age: 64
End: 2024-04-22
Payer: COMMERCIAL

## 2024-04-22 DIAGNOSIS — M19.279 OSTEOARTHRITIS OF MIDFOOT DUE TO INFLAMMATORY ARTHRITIS: ICD-10-CM

## 2024-04-22 DIAGNOSIS — M19.90 OSTEOARTHRITIS OF MIDFOOT DUE TO INFLAMMATORY ARTHRITIS: ICD-10-CM

## 2024-04-22 DIAGNOSIS — Q70.22 FUSION OF TOES OF LEFT FOOT: Primary | ICD-10-CM

## 2024-04-22 PROCEDURE — 97110 THERAPEUTIC EXERCISES: CPT | Performed by: PHYSICAL THERAPIST

## 2024-04-22 PROCEDURE — 97140 MANUAL THERAPY 1/> REGIONS: CPT | Performed by: PHYSICAL THERAPIST

## 2024-04-22 NOTE — PROGRESS NOTES
Daily Note     Today's date: 2024  Patient name: Julia Engelmann  : 1960  MRN: [id number]  Referring provider: Lachman, James R, MD  Dx:   Encounter Diagnosis     ICD-10-CM    1. Fusion of toes of left foot  Q70.22       2. 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  Some foot and ankle pain in her sneakers.           Objective:  See treatment diary below      Assessment:  Pt presented to outpatient physical therapy at [facility] with complaints of L foot pain.  She presents with decreased range of motion, decreased strength, limited flexibility, altered gait pattern, poor balance, decreased tolerance to activity and decreased functional mobility following fusion of toes of left foot on 24 due to osteoarthritis of midfoot due to inflammatory arthritis.  Significant increase in foot STM and ROM each session with slightly less pain.  Very good tolerance to added WB exercises today with little pain.  Gait is improved after cueing to decrease impact on R foot since last week.  She will continue to benefit from skilled PT services in order to address these deficits and reach maximum level of function.      Plan:  Continue elliptical, more L LE balance.       POC EXPIRES On:  24  PRECAUTIONS:  PWB L Foot - No bike due to limited knee ROM  CO-MORBIDITES:  B TKA  PERSONAL FACTORS:  None      Manuals HEP        L plantar fascia STM  6' 12' 8' 6' 6'       PROM L ankle all motions  4' 8' 4' 7' 7'       STM L ankle     2' 2'                    Neuro Re-Ed     Tandem walk 12' FWD on foam     5 laps -  no foam 3 laps       Sidestepping on foam 12'     NV 3 laps       SLS L                                                                 Ther Ex    Toe flexion L  20 30 30 30 30       S/L L ankle inv/ev  20 ea 30 ea 30 ea 30 ea 2# 30 ea       Strap  "stretch into DF L 4/9 20\" 5 20\" 8 20\" 8 D/C        Seated L ankle DF/PF 4/12  20 ea 20 ea 20 ea D/C       B calf raises 4/16   20 20 20        Standing marching L/R 4/16   20 ea 20 ea 20 ea on blue foam       Standing hip abd L/R 4/16   20 ea 20 ea 20 ea on blue foam       Standing L gastroc stretch 4/19    20\" 3  20\" 3       Standing L soleus stretch 4/19    20\" 3 20\" 3       B leg press      75# 3x15       Elliptical     NV L1 10'       Ther Activity                              Gait Training    No AD     5' 3'                    Modalities                                         "

## 2024-04-24 ENCOUNTER — APPOINTMENT (OUTPATIENT)
Dept: PHYSICAL THERAPY | Facility: CLINIC | Age: 64
End: 2024-04-24
Payer: COMMERCIAL

## 2024-04-25 ENCOUNTER — APPOINTMENT (OUTPATIENT)
Dept: PHYSICAL THERAPY | Facility: CLINIC | Age: 64
End: 2024-04-25
Payer: COMMERCIAL

## 2024-04-29 ENCOUNTER — OFFICE VISIT (OUTPATIENT)
Dept: PHYSICAL THERAPY | Facility: CLINIC | Age: 64
End: 2024-04-29
Payer: COMMERCIAL

## 2024-04-29 DIAGNOSIS — M19.90 OSTEOARTHRITIS OF MIDFOOT DUE TO INFLAMMATORY ARTHRITIS: ICD-10-CM

## 2024-04-29 DIAGNOSIS — Q70.22 FUSION OF TOES OF LEFT FOOT: Primary | ICD-10-CM

## 2024-04-29 DIAGNOSIS — M19.279 OSTEOARTHRITIS OF MIDFOOT DUE TO INFLAMMATORY ARTHRITIS: ICD-10-CM

## 2024-04-29 PROCEDURE — 97140 MANUAL THERAPY 1/> REGIONS: CPT | Performed by: PHYSICAL THERAPIST

## 2024-04-29 PROCEDURE — 97110 THERAPEUTIC EXERCISES: CPT | Performed by: PHYSICAL THERAPIST

## 2024-04-29 PROCEDURE — 97112 NEUROMUSCULAR REEDUCATION: CPT | Performed by: PHYSICAL THERAPIST

## 2024-04-29 NOTE — PROGRESS NOTES
Daily Note     Today's date: 2024  Patient name: Julia Engelmann  : 1960  MRN: [id number]  Referring provider: Lachman, James R, MD  Dx:   Encounter Diagnosis     ICD-10-CM    1. Fusion of toes of left foot  Q70.22       2. 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  Stood for a long time during the weekend and foot was very sore but good today.           Objective:  See treatment diary below      Assessment:  Pt presented to outpatient physical therapy at [facility] with complaints of L foot pain.  She presents with decreased range of motion, decreased strength, limited flexibility, altered gait pattern, poor balance, decreased tolerance to activity and decreased functional mobility following fusion of toes of left foot on 24 due to osteoarthritis of midfoot due to inflammatory arthritis.  Her foot and ankle ROM is improving with every PT session with lessening pain.  She still has difficulty if she is standing for extended periods of time due to pain.  Good tolerance to added WB exercises today but still challenged with balance tasks.  Gait is improving.  She will continue to benefit from skilled PT services in order to address these deficits and reach maximum level of function.      Plan:  Continue to increase L LE balance tasks - TB kicks - star excursion, bosu mini squats.        POC EXPIRES On:  24  PRECAUTIONS:  PWB L Foot - No bike due to limited knee ROM  CO-MORBIDITES:  B TKA  PERSONAL FACTORS:  None      Manuals HEP       L plantar fascia STM  6' 12' 8' 6' 6' 6'      PROM L ankle all motions  4' 8' 4' 7' 7' 7'      STM L ankle     2' 2' 2'                   Neuro Re-Ed     Tandem walk 12' FWD on foam     5 laps -  no foam 3 laps 4 laps      Sidestepping on foam 12'     NV 3 laps 4 laps      SLS  "L       Blue foam 5\" 10      Lunges onto bosu dome up - FWD + side L foot leading       15 ea                                             Ther Ex    Toe flexion L 4/9 20 30 30 30 30 30      S/L L ankle inv/ev 4/9 20 ea 30 ea 30 ea 30 ea 2# 30 ea 2# 30 ea      Strap stretch into DF L 4/9 20\" 5 20\" 8 20\" 8 D/C        Seated L ankle DF/PF 4/12  20 ea 20 ea 20 ea D/C       B calf raises 4/16   20 20 20  30      Standing marching L/R 4/16   20 ea 20 ea 20 ea on blue foam 20 ea on blue foam      Standing hip abd L/R 4/16   20 ea 20 ea 20 ea on blue foam 20 ea on blue foam      Standing L gastroc stretch 4/19    20\" 3  20\" 3 20\" 3      Standing L soleus stretch 4/19    20\" 3 20\" 3 20\" 3      B leg press      75# 3x15 75# 3x15      Elliptical     NV L1 10' D/C      Ther Activity                              Gait Training    No AD     5' 3'                    Modalities                                         "

## 2024-05-01 ENCOUNTER — OFFICE VISIT (OUTPATIENT)
Dept: PHYSICAL THERAPY | Facility: CLINIC | Age: 64
End: 2024-05-01
Payer: COMMERCIAL

## 2024-05-01 DIAGNOSIS — M19.90 OSTEOARTHRITIS OF MIDFOOT DUE TO INFLAMMATORY ARTHRITIS: ICD-10-CM

## 2024-05-01 DIAGNOSIS — Q70.22 FUSION OF TOES OF LEFT FOOT: Primary | ICD-10-CM

## 2024-05-01 DIAGNOSIS — M19.279 OSTEOARTHRITIS OF MIDFOOT DUE TO INFLAMMATORY ARTHRITIS: ICD-10-CM

## 2024-05-01 PROCEDURE — 97110 THERAPEUTIC EXERCISES: CPT | Performed by: PHYSICAL THERAPIST

## 2024-05-01 PROCEDURE — 97140 MANUAL THERAPY 1/> REGIONS: CPT | Performed by: PHYSICAL THERAPIST

## 2024-05-01 PROCEDURE — 97112 NEUROMUSCULAR REEDUCATION: CPT | Performed by: PHYSICAL THERAPIST

## 2024-05-01 NOTE — PROGRESS NOTES
Daily Note     Today's date: 2024  Patient name: Julia Engelmann  : 1960  MRN: [id number]  Referring provider: Lachman, James R, MD  Dx:   Encounter Diagnosis     ICD-10-CM    1. Fusion of toes of left foot  Q70.22       2. Osteoarthritis of midfoot due to inflammatory arthritis  M19.279     M19.90                                Subjective:  Pt reports feeling better.  Only issue is soreness at lateral foot.  Doing 45 on the elliptical now at the gym.              Objective:  See treatment diary below      Assessment:  Pt presented to outpatient physical therapy at [facility] with complaints of L foot pain.  She presents with decreased range of motion, decreased strength, limited flexibility, altered gait pattern, poor balance, decreased tolerance to activity and decreased functional mobility following fusion of toes of left foot on 24 due to osteoarthritis of midfoot due to inflammatory arthritis.  Her foot and ankle ROM is improving with every PT session with lessening pain.  Still some lateral foot plantar tightness.  She still has difficulty if she is standing for extended periods of time due to pain but improving.  Good tolerance to added WB exercises today but still challenged moderately with balance tasks.  Gait is improving.  She will continue to benefit from skilled PT services in order to address these deficits and reach maximum level of function.      Plan:  Continue to increase L LE balance tasks - TB kicks.        POC EXPIRES On:  24  PRECAUTIONS:  PWB L Foot - No bike due to limited knee ROM  CO-MORBIDITES:  B TKA  PERSONAL FACTORS:  None      Manuals HEP      L plantar fascia STM  6' 12' 8' 6' 6' 6' 11'     PROM L ankle all motions  4' 8' 4' 7' 7' 7' 7'     STM L ankle     2' 2' 2' 2'                  Neuro Re-Ed     Tandem walk 12' FWD on foam     5 laps -  no foam 3 laps 4 laps 4 laps     Sidestepping on foam 12'     NV 3 laps 4 laps 4 laps    "  SLS L on disc       Blue foam 5\" 10 Black 5\" 20     Lunges onto bosu dome up - FWD + side L foot leading       15 ea 20 ea     Mini squats on bosu dome down        20     L SLS on disc with R foot star excursion 3 points        Blue 10 ea                  Ther Ex    Toe flexion L 4/9 20 30 30 30 30 30 30     S/L L ankle inv/ev 4/9 20 ea 30 ea 30 ea 30 ea 2# 30 ea 2# 30 ea 3# 30 ea     Strap stretch into DF L 4/9 20\" 5 20\" 8 20\" 8 D/C        Seated L ankle DF/PF 4/12  20 ea 20 ea 20 ea D/C       B calf raises 4/16   20 20 20  30 30     Standing marching L/R 4/16   20 ea 20 ea 20 ea on blue foam 20 ea on blue foam 20 ea on blue foam     Standing hip abd L/R 4/16   20 ea 20 ea 20 ea on blue foam 20 ea on blue foam 20 ea on blue foam     Standing L gastroc stretch 4/19    20\" 3  20\" 3 20\" 3 20\" 3     Standing L soleus stretch 4/19    20\" 3 20\" 3 20\" 3 20\" 3     B leg press      75# 3x15 75# 3x15 95# 3x15     Elliptical     NV L1 10' D/C      Ther Activity                              Gait Training    No AD     5' 3'                    Modalities                                         "

## 2024-05-07 ENCOUNTER — OFFICE VISIT (OUTPATIENT)
Dept: PHYSICAL THERAPY | Facility: CLINIC | Age: 64
End: 2024-05-07
Payer: COMMERCIAL

## 2024-05-07 DIAGNOSIS — M19.279 OSTEOARTHRITIS OF MIDFOOT DUE TO INFLAMMATORY ARTHRITIS: ICD-10-CM

## 2024-05-07 DIAGNOSIS — Q70.22 FUSION OF TOES OF LEFT FOOT: Primary | ICD-10-CM

## 2024-05-07 DIAGNOSIS — M19.90 OSTEOARTHRITIS OF MIDFOOT DUE TO INFLAMMATORY ARTHRITIS: ICD-10-CM

## 2024-05-07 PROCEDURE — 97110 THERAPEUTIC EXERCISES: CPT

## 2024-05-07 PROCEDURE — 97112 NEUROMUSCULAR REEDUCATION: CPT

## 2024-05-07 PROCEDURE — 97140 MANUAL THERAPY 1/> REGIONS: CPT

## 2024-05-07 NOTE — PROGRESS NOTES
"Daily Note     Today's date: 2024  Patient name: Julia Engelmann  : 1960  MRN: [id number]  Referring provider: Lachman, James R, MD  Dx:   Encounter Diagnosis     ICD-10-CM    1. Fusion of toes of left foot  Q70.22       2. 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       Objective: See treatment diary below      Assessment: Tolerated treatment well. Patient exhibited good technique with therapeutic exercises and would benefit from continued PT Patient was able to perform added exercises with correct form and no complaints.       Plan: Continue per plan of care.      OC EXPIRES On:  24  PRECAUTIONS:  PWB L Foot - No bike due to limited knee ROM  CO-MORBIDITES:  B TKA  PERSONAL FACTORS:  None      Manuals HEP  5    L plantar fascia STM  6' 12' 8' 6' 6' 6' 11'     PROM L ankle all motions  4' 8' 4' 7' 7' 7' 7'     STM L ankle     2' 2' 2' 2'                  Neuro Re-Ed     Tandem walk 12' FWD on foam     5 laps -  no foam 3 laps 4 laps 4 laps 4 laps     Sidestepping on foam 12'     NV 3 laps 4 laps 4 laps 4 laps     SLS L on disc       Blue foam 5\" 10 Black 5\" 20 Black 5\" x 20    Lunges onto bosu dome up - FWD + side L foot leading       15 ea 20 ea 20 ea     Mini squats on bosu dome down        20 20     L SLS on disc with R foot star excursion 3 points        Blue 10 ea Blue 10 ea                  Ther Ex    Toe flexion L  20 30 30 30 30 30 30     S/L L ankle inv/ev  20 ea 30 ea 30 ea 30 ea 2# 30 ea 2# 30 ea 3# 30 ea     Strap stretch into DF L  20\" 5 20\" 8 20\" 8 D/C        Seated L ankle DF/PF   20 ea 20 ea 20 ea D/C       B calf raises    20 20 20  30 30 30    Standing marching L/R    20 ea 20 ea 20 ea on blue foam 20 ea on blue foam 20 ea on blue foam 20 ea on blue foam    Standing hip abd L/R    20 ea 20 ea 20 ea on blue foam 20 ea on blue " "foam 20 ea on blue foam 20 ea on blue foam    Standing L gastroc stretch 4/19    20\" 3  20\" 3 20\" 3 20\" 3 20\" x 3    Standing L soleus stretch 4/19    20\" 3 20\" 3 20\" 3 20\" 3 20\" x 3    B leg press      75# 3x15 75# 3x15 95# 3x15 95# 3 x 15    Standing hip 4 way with TB         2 x 10    Elliptical     NV L1 10' D/C      Ther Activity    Step ups         8 inch   2 x 10                  Gait Training    No AD     5' 3'                    Modalities                                   "

## 2024-05-10 ENCOUNTER — OFFICE VISIT (OUTPATIENT)
Dept: PHYSICAL THERAPY | Facility: CLINIC | Age: 64
End: 2024-05-10
Payer: COMMERCIAL

## 2024-05-10 DIAGNOSIS — M19.279 OSTEOARTHRITIS OF MIDFOOT DUE TO INFLAMMATORY ARTHRITIS: ICD-10-CM

## 2024-05-10 DIAGNOSIS — Q70.22 FUSION OF TOES OF LEFT FOOT: Primary | ICD-10-CM

## 2024-05-10 DIAGNOSIS — M19.90 OSTEOARTHRITIS OF MIDFOOT DUE TO INFLAMMATORY ARTHRITIS: ICD-10-CM

## 2024-05-10 PROCEDURE — 97112 NEUROMUSCULAR REEDUCATION: CPT | Performed by: PHYSICAL THERAPIST

## 2024-05-10 PROCEDURE — 97110 THERAPEUTIC EXERCISES: CPT | Performed by: PHYSICAL THERAPIST

## 2024-05-10 PROCEDURE — 97140 MANUAL THERAPY 1/> REGIONS: CPT | Performed by: PHYSICAL THERAPIST

## 2024-05-10 NOTE — PROGRESS NOTES
Daily Note     Today's date: 5/10/2024  Patient name: Julia Engelmann  : 1960  MRN: [id number]  Referring provider: Lachman, James R, MD  Dx:   Encounter Diagnosis     ICD-10-CM    1. Fusion of toes of left foot  Q70.22       2. 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         Objective:  See treatment diary below      Assessment:  Pt presented to outpatient physical therapy at [facility] with complaints of L foot pain.  She presents with decreased range of motion, decreased strength, limited flexibility, altered gait pattern, poor balance, decreased tolerance to activity and decreased functional mobility following fusion of toes of left foot on 24 due to osteoarthritis of midfoot due to inflammatory arthritis.  Her foot and ankle ROM is improving with every PT session with lessening pain aside from min more pain today to start the session that lessened through treatment.  Still some lateral foot plantar tightness.  She still has difficulty if she is standing for extended periods of time due to pain but improving and now able to ascend steps well with slight limitations down but due to knee not ankle or foot.  Gait and balance are improving.  She will continue to benefit from skilled PT services in order to address these deficits and reach maximum level of function.      Plan:  Continue to add balance tasks.        OC EXPIRES On:  24  PRECAUTIONS:  PWB L Foot - No bike due to limited knee ROM  CO-MORBIDITES:  B TKA  PERSONAL FACTORS:  None      Manuals HEP 4/9 4/12 4/16 4/19 4/22 4/29 5/1 5/7 5/10   L plantar fascia STM  6' 12' 8' 6' 6' 6' 11'  11'   PROM L ankle all motions  4' 8' 4' 7' 7' 7' 7'  7'   STM L ankle     2' 2' 2' 2'  2'                Neuro Re-Ed     Tandem walk 12' FWD on foam     5 laps -  no foam 3 laps 4 laps 4 laps 4 laps  4 laps   Sidestepping on foam  "12'     NV 3 laps 4 laps 4 laps 4 laps  4 laps   SLS L on disc       Blue foam 5\" 10 Black 5\" 20 Black 5\" x 20 Blue 5\" 20   Lunges onto bosu dome up - FWD + side L foot leading       15 ea 20 ea 20 ea  20 ea   Mini squats on bosu dome down        20 20  20   L SLS on disc with R foot star excursion 3 points        Blue 10 ea Blue 10 ea  Blue 10 ea                Ther Ex    Toe flexion L 4/9 20 30 30 30 30 30 30  30   S/L L ankle inv/ev 4/9 20 ea 30 ea 30 ea 30 ea 2# 30 ea 2# 30 ea 3# 30 ea  3# 30 ea   Strap stretch into DF L 4/9 20\" 5 20\" 8 20\" 8 D/C        Seated L ankle DF/PF 4/12  20 ea 20 ea 20 ea D/C       B calf raises 4/16   20 20 20  30 30 30 30   Standing marching L/R 4/16   20 ea 20 ea 20 ea on blue foam 20 ea on blue foam 20 ea on blue foam 20 ea on blue foam 20 ea on blue foam   Standing hip abd L/R 4/16   20 ea 20 ea 20 ea on blue foam 20 ea on blue foam 20 ea on blue foam 20 ea on blue foam 20 ea on blue foam   Standing L gastroc stretch 4/19    20\" 3  20\" 3 20\" 3 20\" 3 20\" x 3 20\" 3   Standing L soleus stretch 4/19    20\" 3 20\" 3 20\" 3 20\" 3 20\" x 3 20\" 3   B leg press      75# 3x15 75# 3x15 95# 3x15 95# 3 x 15 95# 3x15   Standing hip 4 way with TB         2 x 10 Blue 15 ea   Elliptical     NV L1 10' D/C      Ther Activity    Step ups L up, R down         8 inch   2 x 10  8\" 20                Gait Training    No AD     5' 3'                    Modalities                                   "

## 2024-05-14 ENCOUNTER — OFFICE VISIT (OUTPATIENT)
Dept: PHYSICAL THERAPY | Facility: CLINIC | Age: 64
End: 2024-05-14
Payer: COMMERCIAL

## 2024-05-14 DIAGNOSIS — M19.90 OSTEOARTHRITIS OF MIDFOOT DUE TO INFLAMMATORY ARTHRITIS: ICD-10-CM

## 2024-05-14 DIAGNOSIS — M19.279 OSTEOARTHRITIS OF MIDFOOT DUE TO INFLAMMATORY ARTHRITIS: ICD-10-CM

## 2024-05-14 DIAGNOSIS — Q70.22 FUSION OF TOES OF LEFT FOOT: Primary | ICD-10-CM

## 2024-05-14 PROCEDURE — 97140 MANUAL THERAPY 1/> REGIONS: CPT | Performed by: PHYSICAL THERAPIST

## 2024-05-14 PROCEDURE — 97110 THERAPEUTIC EXERCISES: CPT | Performed by: PHYSICAL THERAPIST

## 2024-05-14 PROCEDURE — 97112 NEUROMUSCULAR REEDUCATION: CPT | Performed by: PHYSICAL THERAPIST

## 2024-05-14 NOTE — PROGRESS NOTES
Daily Note     Today's date: 2024  Patient name: Julia Engelmann  : 1960  MRN: [id number]  Referring provider: Lachman, James R, MD  Dx:   Encounter Diagnosis     ICD-10-CM    1. Fusion of toes of left foot  Q70.22       2. 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  Overall has had a lot of progression since surgery.        Objective:  See treatment diary below      Assessment:  Pt presented to outpatient physical therapy at [facility] with complaints of L foot pain.  She presents with decreased range of motion, decreased strength, limited flexibility, altered gait pattern, poor balance, decreased tolerance to activity and decreased functional mobility following fusion of toes of left foot on 24 due to osteoarthritis of midfoot due to inflammatory arthritis.  Her foot and ankle ROM is improving with every PT session with lessening pain aside from min more pain today after standing for 1 hour yesterday.  Most plantar fascia tightness has resolved with STM and foot strengthening.  Gait pattern is WNL now and L LE balance has increased from poor to good vs excellent on R.  She will continue to benefit from skilled PT services in order to address these deficits and reach maximum level of function.      Plan:  Continue to add balance tasks- add standing BAPS.  D/C steps.  To MD on 5/15/24.  Likely 4 more weeks of PT until D/C.        OC EXPIRES On:  24  PRECAUTIONS:  PWB L Foot - No bike due to limited knee ROM  CO-MORBIDITES:  B TKA  PERSONAL FACTORS:  None      Manuals HEP 4/9 4/12 4/16 4/19 4/22 4/29 5/1 5/7 5/10 5/14   L plantar fascia STM  6' 12' 8' 6' 6' 6' 11'  11' 8'   PROM L ankle all motions  4' 8' 4' 7' 7' 7' 7'  7' 7'   STM L ankle     2' 2' 2' 2'  2' NP                 Neuro Re-Ed      Tandem walk 12' FWD on foam     5 laps -  no foam 3 laps 4  "laps 4 laps 4 laps  4 laps 4 laps   Sidestepping on foam 12'     NV 3 laps 4 laps 4 laps 4 laps  4 laps 4 laps   SLS L on disc       Blue foam 5\" 10 Black 5\" 20 Black 5\" x 20 Blue 5\" 20 D/C   Lunges onto bosu dome up - FWD + side L foot leading       15 ea 20 ea 20 ea  20 ea 20 ea   Mini squats on bosu dome down        20 20  20 20   L SLS on disc with R foot star excursion 3 points        Blue 10 ea Blue 10 ea  Blue 10 ea Black 15 ea                 Ther Ex     Toe flexion L  20 30 30 30 30 30 30  30 30   S/L L ankle inv/ev  20 ea 30 ea 30 ea 30 ea 2# 30 ea 2# 30 ea 3# 30 ea  3# 30 ea 3# 30 ea   Strap stretch into DF L  20\" 5 20\" 8 20\" 8 D/C         Seated L ankle DF/PF   20 ea 20 ea 20 ea D/C        B calf raises    20 20 20  30 30 30 30 30   Standing marching L/R    20 ea 20 ea 20 ea on blue foam 20 ea on blue foam 20 ea on blue foam 20 ea on blue foam 20 ea on blue foam 20 ea on blue foam   Standing hip abd L/R    20 ea 20 ea 20 ea on blue foam 20 ea on blue foam 20 ea on blue foam 20 ea on blue foam 20 ea on blue foam 20 ea on blue foam   Standing L gastroc stretch     20\" 3  20\" 3 20\" 3 20\" 3 20\" x 3 20\" 3 20\" 3   Standing L soleus stretch     20\" 3 20\" 3 20\" 3 20\" 3 20\" x 3 20\" 3 20\" 3   B leg press      75# 3x15 75# 3x15 95# 3x15 95# 3 x 15 95# 3x15 95# 3x15   Standing hip 4 way with TB         2 x 10 Blue 15 ea Blue 20 ea   Elliptical     NV L1 10' D/C       Ther Activity     Step ups L up, R down         8 inch   2 x 10  8\" 20 8\" 20 ea                 Gait Training     No AD     5' 3'                      Modalities                                    Daily Note     Today's date: 2024  Patient name: Julia Engelmann  : 1960  MRN: [id number]  Referring provider: Lachman, James R, MD  Dx:   Encounter Diagnosis     ICD-10-CM    1. Fusion of toes of left foot  Q70.22       2. 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         Objective:  See treatment diary below      Assessment:  Pt presented to outpatient physical therapy at [facility] with complaints of L foot pain.  She presents with decreased range of motion, decreased strength, limited flexibility, altered gait pattern, poor balance, decreased tolerance to activity and decreased functional mobility following fusion of toes of left foot on 2/19/24 due to osteoarthritis of midfoot due to inflammatory arthritis.  Her foot and ankle ROM is improving with every PT session with lessening pain aside from min more pain today to start the session that lessened through treatment.  Still some lateral foot plantar tightness.  She still has difficulty if she is standing for extended periods of time due to pain but improving and now able to ascend steps well with slight limitations down but due to knee not ankle or foot.  Gait and balance are improving.  She will continue to benefit from skilled PT services in order to address these deficits and reach maximum level of function.      Plan:  Continue to add balance tasks.        OC EXPIRES On:  7/2/24  PRECAUTIONS:  PWB L Foot - No bike due to limited knee ROM  CO-MORBIDITES:  B TKA  PERSONAL FACTORS:  None      Manuals HEP 4/9 4/12 4/16 4/19 4/22 4/29 5/1 5/7 5/10   L plantar fascia STM  6' 12' 8' 6' 6' 6' 11'  11'   PROM L ankle all motions  4' 8' 4' 7' 7' 7' 7'  7'   STM L ankle     2' 2' 2' 2'  2'                Neuro Re-Ed     Tandem walk 12' FWD on foam     5 laps -  no foam 3 laps 4 laps 4 laps 4 laps  4 laps   Sidestepping on foam 12'     NV 3 laps 4 laps 4 laps 4 laps  4 laps   SLS L on disc       Blue foam 5\" 10 Black 5\" 20 Black 5\" x 20 Blue 5\" 20   Lunges onto bosu dome up - FWD + side L foot leading       15 ea 20 ea 20 ea  20 ea   Mini squats on bosu dome down        20 20  20   L SLS on disc with R foot star excursion 3 points        Blue 10 ea " "Blue 10 ea  Blue 10 ea                Ther Ex    Toe flexion L 4/9 20 30 30 30 30 30 30  30   S/L L ankle inv/ev 4/9 20 ea 30 ea 30 ea 30 ea 2# 30 ea 2# 30 ea 3# 30 ea  3# 30 ea   Strap stretch into DF L 4/9 20\" 5 20\" 8 20\" 8 D/C        Seated L ankle DF/PF 4/12  20 ea 20 ea 20 ea D/C       B calf raises 4/16   20 20 20  30 30 30 30   Standing marching L/R 4/16   20 ea 20 ea 20 ea on blue foam 20 ea on blue foam 20 ea on blue foam 20 ea on blue foam 20 ea on blue foam   Standing hip abd L/R 4/16   20 ea 20 ea 20 ea on blue foam 20 ea on blue foam 20 ea on blue foam 20 ea on blue foam 20 ea on blue foam   Standing L gastroc stretch 4/19    20\" 3  20\" 3 20\" 3 20\" 3 20\" x 3 20\" 3   Standing L soleus stretch 4/19    20\" 3 20\" 3 20\" 3 20\" 3 20\" x 3 20\" 3   B leg press      75# 3x15 75# 3x15 95# 3x15 95# 3 x 15 95# 3x15   Standing hip 4 way with TB         2 x 10 Blue 15 ea   Elliptical     NV L1 10' D/C      Ther Activity    Step ups L up, R down         8 inch   2 x 10  8\" 20                Gait Training    No AD     5' 3'                    Modalities                                   "

## 2024-05-15 ENCOUNTER — APPOINTMENT (OUTPATIENT)
Dept: RADIOLOGY | Facility: AMBULARY SURGERY CENTER | Age: 64
End: 2024-05-15
Attending: ORTHOPAEDIC SURGERY
Payer: COMMERCIAL

## 2024-05-15 ENCOUNTER — TELEPHONE (OUTPATIENT)
Dept: OBGYN CLINIC | Facility: CLINIC | Age: 64
End: 2024-05-15

## 2024-05-15 ENCOUNTER — OFFICE VISIT (OUTPATIENT)
Dept: OBGYN CLINIC | Facility: CLINIC | Age: 64
End: 2024-05-15

## 2024-05-15 VITALS — BODY MASS INDEX: 25.2 KG/M2 | WEIGHT: 125 LBS | HEIGHT: 59 IN

## 2024-05-15 DIAGNOSIS — M19.90 OSTEOARTHRITIS OF MIDFOOT DUE TO INFLAMMATORY ARTHRITIS: ICD-10-CM

## 2024-05-15 DIAGNOSIS — Z01.89 ENCOUNTER FOR LOWER EXTREMITY COMPARISON IMAGING STUDY: ICD-10-CM

## 2024-05-15 DIAGNOSIS — M19.279 OSTEOARTHRITIS OF MIDFOOT DUE TO INFLAMMATORY ARTHRITIS: ICD-10-CM

## 2024-05-15 DIAGNOSIS — M19.279 OSTEOARTHRITIS OF MIDFOOT DUE TO INFLAMMATORY ARTHRITIS: Primary | ICD-10-CM

## 2024-05-15 DIAGNOSIS — M19.90 OSTEOARTHRITIS OF MIDFOOT DUE TO INFLAMMATORY ARTHRITIS: Primary | ICD-10-CM

## 2024-05-15 PROCEDURE — 99024 POSTOP FOLLOW-UP VISIT: CPT | Performed by: ORTHOPAEDIC SURGERY

## 2024-05-15 PROCEDURE — 73630 X-RAY EXAM OF FOOT: CPT

## 2024-05-15 PROCEDURE — 73620 X-RAY EXAM OF FOOT: CPT

## 2024-05-15 NOTE — PROGRESS NOTES
"      James R Lachman, M.D.  Attending, Orthopaedic Surgery  Foot and Ankle  [facility]      ORTHOPAEDIC FOOT AND ANKLE POST-OP VISIT     Procedure:      Left 2nd and 3rd TMT and naviculocuneiform joint fusions with 4th and 5th TMT joint interpositional arthroplasties       Date of surgery:   2/19/24      PLAN  1. Weightbearing Status - WBAT operative extremity  2. DVT prophylaxis - Completed  3. Continue PT/HEP as directed  4. Pain control - OTC pain medication  5. RTC in 3 month(s)  6. Xrays needed next visit - Yes weightbearing foot    History of Present Illness:   Chief Complaint:   Chief Complaint   Patient presents with    Post-op     Post op of the left foot.        Julia Engelmann is a 63 y.o. female who is being seen for 3 month post-operative visit for the above procedure. Pain is well controlled and the patient has successfully transitioned to OTC pain medicines.  she has completed ASA 325mg BID for DVT prophylaxis. Patient has been WBAT in a Supportive sneaker      Review of Systems:  General- denies fever/chills  Respiratory- denies cough or SOB  Cardio- denies chest pain or palpitations  GI- denies abdominal pain  Musculoskeletal- Negative except noted above  Skin- denies rashes or wounds    Physical Exam:   Ht 4' 11\" (1.499 m)   Wt 56.7 kg (125 lb)   BMI 25.25 kg/m²   General/Constitutional: No apparent distress: well-nourished and well developed.  Eyes: normal ocular motion  Lymphatic: No appreciable lymphadenopathy  Respiratory: Non-labored breathing  Vascular: No edema, swelling or tenderness, except as noted in detailed exam.  Integumentary: No impressive skin lesions present, except as noted in detailed exam.  Neuro: No ataxia or tremors noted  Psych: Normal mood and affect, oriented to person, place and time. Appropriate affect.  Musculoskeletal: Normal, except as noted in detailed exam and in HPI.    Examination    left        Incision Clean, dry, intact  Sutures " Previously removed.    Ecchymosis none    Swelling mild    Sensation Intact to light touch throughout sural, saphenous, superficial peroneal, deep peroneal and medial/lateral plantar nerve distributions.  [place] 5.07 filament (10g) testing deferred.    Cardiovascular Brisk capillary refill < 2 seconds,intact DP and PT pulses    Special Tests None      Imaging Studies:   3 views of the left foot were taken, reviewed and interpreted independently that demonstrate hardware in expected position without signs of failure or loosening. Reviewed by me personally.      Scribe Attestation      I,:  Ian Lares am acting as a scribe while in the presence of the attending physician.:       I,:  James R Lachman, MD personally performed the services described in this documentation    as scribed in my presence.:                James R. Lachman, MD  Foot & Ankle Surgery   Department of Orthopaedic Surgery  [facility]      I personally performed the service.    James R. Lachman, MD

## 2024-05-17 ENCOUNTER — EVALUATION (OUTPATIENT)
Dept: PHYSICAL THERAPY | Facility: CLINIC | Age: 64
End: 2024-05-17
Payer: COMMERCIAL

## 2024-05-17 DIAGNOSIS — M19.279 OSTEOARTHRITIS OF MIDFOOT DUE TO INFLAMMATORY ARTHRITIS: ICD-10-CM

## 2024-05-17 DIAGNOSIS — M19.90 OSTEOARTHRITIS OF MIDFOOT DUE TO INFLAMMATORY ARTHRITIS: ICD-10-CM

## 2024-05-17 DIAGNOSIS — Q70.22 FUSION OF TOES OF LEFT FOOT: Primary | ICD-10-CM

## 2024-05-17 PROCEDURE — 97140 MANUAL THERAPY 1/> REGIONS: CPT | Performed by: PHYSICAL THERAPIST

## 2024-05-17 PROCEDURE — 97110 THERAPEUTIC EXERCISES: CPT | Performed by: PHYSICAL THERAPIST

## 2024-05-17 PROCEDURE — 97112 NEUROMUSCULAR REEDUCATION: CPT | Performed by: PHYSICAL THERAPIST

## 2024-05-17 NOTE — PROGRESS NOTES
PT Re-evaluation    Today's date: 2024  Patient name: Julia Engelmann  : 1960  MRN: [id number]  Referring provider: Lachman, James R, MD  Dx:   Encounter Diagnosis     ICD-10-CM    1. Fusion of toes of left foot  Q70.22       2. Osteoarthritis of midfoot due to inflammatory arthritis  M19.279     M19.90                      Assessment  Impairments: abnormal or restricted ROM, activity intolerance, impaired balance, impaired physical strength and pain with function    Assessment details: Julia Engelmann is a 63 y.o. female who presented to outpatient physical therapy at [facility] with complaints of L foot pain.  She presented with decreased range of motion, decreased strength, limited flexibility, altered gait pattern, poor balance, decreased tolerance to activity and decreased functional mobility following fusion of toes of left foot on 24 due to osteoarthritis of midfoot due to inflammatory arthritis.  Her progression has been excellent since starting PT despite weaning herself out of her CAM boot early against MD and PT advice.  Her gait pattern is now WNL, L ankle ROM is almost WNL and strength is very close to WNL.  She still has some plantar fascia tightness but little pain most days unless she walks too long.  She will continue to benefit from skilled PT services in order to address these deficits and reach maximum level of function.  Thank you for the referral!  Barriers to therapy: None  Understanding of Dx/Px/POC: excellent     Prognosis: excellent    Goals  ST.  Independent with HEP in 2 weeks - Met  2.  Increase L ankle AROM by 5 degrees all motions in 3 weeks - Met     LT.  Achieve FOTO score of 53/100 in 12 weeks - Met  2.  Able to ambulate x 20 min on all surfaces for exercise and ADLs in 12 weeks - Met  3.  Strength all L ankle motions = 5/5, L foot intrinsics = 4-/5 in 12 weeks - Mostly Met  4.  Good L LE balance in 12 weeks - Met  5.  Normalized gait pattern in 12 weeks  - Met    Plan  Patient would benefit from: skilled PT  Planned modality interventions: cryotherapy and thermotherapy: hydrocollator packs    Planned therapy interventions: ADL retraining, balance/weight bearing training, flexibility, functional ROM exercises, home exercise program, joint mobilization, manual therapy, neuromuscular re-education, strengthening, stretching, therapeutic activities and therapeutic exercise    Frequency: 2x week  Duration in weeks: 6  Treatment plan discussed with: patient        Subjective Evaluation    History of Present Illness  Mechanism of injury: On 24 pt had L 2nd and 3rd and naviculocuneiform joint fusions with 4th and 5th TMT joint interpositional arthroplasties due to long hx of foot pain x 20 years.  On 24 she began PWB L LE vs NWB status since surgery.  She has hx of R TKA in  and L TKA in .  Retired pharmacy tech.  Unable to ride a bike or descend steps due to limited R knee flex ROM.  Using B crutches for ambulation with 50% L LE WB.  Protocol advised increasing to 75% WB on 24 and 90% for 4 days then 100% with weaning to sneaker.  She began WB in her sneaker earlier than advised by MD and PT due to having LBP in her CAM boot.  Fortunately she has not had any noticeable issues.               Recurrent probem    Quality of life: good    Patient Goals  Patient goals for therapy: increased motion, improved balance, decreased pain, decreased edema, increased strength, independence with ADLs/IADLs and return to sport/leisure activities    Pain  Current pain ratin  At best pain ratin  At worst pain ratin  Quality: dull ache and tight  Relieving factors: ice and medications  Aggravating factors: walking, standing and stair climbing  Progression: improved    Social Support  Steps to enter house: yes  Stairs in house: no   Lives in: multiple-level home  Lives with: spouse    Employment status: not working  Treatments  Previous treatment: medication,  immobilization and physical therapy  Current treatment: medication and physical therapy        Objective     Observations   Left Ankle/Foot   Negative for atrophy and effusion.     Additional Observation Details  1.5 cm girth loss L gastroc vs R.     Palpation     Additional Palpation Details  Min tightness L plantar fascia.    Tenderness   Left Ankle/Foot   Tenderness in the plantar fascia.     Neurological Testing     Sensation     Ankle/Foot   Left Ankle/Foot   Diminished: light touch    Right Ankle/Foot   Intact: light touch     Comments   Left light touch: 2nd + 3rd L toes.     Active Range of Motion   Left Ankle/Foot   Dorsiflexion (ke): 2 degrees   Plantar flexion: 32 degrees   Inversion: 21 degrees   Eversion: 11 degrees     Right Ankle/Foot   Normal active range of motion  Dorsiflexion (ke): 7 degrees   Plantar flexion: 45 degrees   Inversion: 34 degrees   Eversion: 18 degrees     Passive Range of Motion   Left Ankle/Foot    Dorsiflexion (ke): 4 degrees   Plantar flexion: 36 degrees   Inversion: 26 degrees   Eversion: 14 degrees     Strength/Myotome Testing     Left Ankle/Foot   Dorsiflexion: 5  Plantar flexion: 4-  Inversion: 4+  Eversion: 4    Right Ankle/Foot   Normal strength    Additional Strength Details  L foot intrinsics = 4-/5    Ambulation   Weight-Bearing Status   Weight-Bearing Status (Left): full weight bearing   Weight-Bearing Status (Right): full weight-bearing    Assistive device used: none    Additional Weight-Bearing Status Details  CAM boot L foot.     Ambulation: Level Surfaces   Ambulation without assistive device: independent    Ambulation: Stairs   Ascend stairs: independent  Pattern: reciprocal  Railings: without rails  Descend stairs: independent  Pattern: reciprocal  Railings: without rails  Curbs: independent    Observational Gait   Walking speed within functional limits.     Comments   Good L LE balance vs excellent R.         OC EXPIRES On:  7/2/24  PRECAUTIONS:  PWB L Foot - No  "bike due to limited knee ROM  CO-MORBIDITES:  B TKA  PERSONAL FACTORS:  None      Manuals HEP 5/17            L plantar fascia STM  8'            PROM L ankle all motions  7'            STM L ankle                            Neuro Re-Ed      Tandem walk 12' FWD on foam  4 laps            Lunges onto bosu dome up - FWD + side L foot leading  20 ea            Mini squats on bosu dome down  20            L SLS on disc with R foot star excursion 3 points  Black 20 ea                          Ther Ex     Toe flexion L 4/9 30            S/L L ankle inv/ev 4/9 3# 30 ea            B calf raises 4/16 30            Standing marching L/R on blue foam 4/16 20            Standing hip abd L/R on blue foam 4/16 20            Standing L gastroc stretch 4/19 20\" 3            Standing L soleus stretch 4/19 20\" 3            B leg press  105# 3x15            Standing hip 4 way with TB L/R  Blue 20 ea                          Ther Activity     Step ups L up, R down                            Gait Training     No AD                            Modalities                                   "

## 2024-05-21 ENCOUNTER — EVALUATION (OUTPATIENT)
Dept: PHYSICAL THERAPY | Facility: CLINIC | Age: 64
End: 2024-05-21
Payer: COMMERCIAL

## 2024-05-21 DIAGNOSIS — Q70.22 FUSION OF TOES OF LEFT FOOT: ICD-10-CM

## 2024-05-21 DIAGNOSIS — M19.90 OSTEOARTHRITIS OF MIDFOOT DUE TO INFLAMMATORY ARTHRITIS: Primary | ICD-10-CM

## 2024-05-21 DIAGNOSIS — M19.279 OSTEOARTHRITIS OF MIDFOOT DUE TO INFLAMMATORY ARTHRITIS: Primary | ICD-10-CM

## 2024-05-21 PROCEDURE — 97110 THERAPEUTIC EXERCISES: CPT | Performed by: PHYSICAL THERAPIST

## 2024-05-21 PROCEDURE — 97112 NEUROMUSCULAR REEDUCATION: CPT | Performed by: PHYSICAL THERAPIST

## 2024-05-21 PROCEDURE — 97140 MANUAL THERAPY 1/> REGIONS: CPT | Performed by: PHYSICAL THERAPIST

## 2024-05-21 NOTE — PROGRESS NOTES
Daily Note     Today's date: 2024  Patient name: Julia Engelmann  : 1960  MRN: [id number]  Referring provider: Lachman, James R, MD  Dx:   Encounter Diagnosis     ICD-10-CM    1. 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 Fusion of toes of left foot  Q70.22                      Subjective:  Pt reports being able to use the elliptical for 1 hour yesterday and foot isn't too bad today.        Objective:  See treatment diary below      Assessment:  Pt presented to outpatient physical therapy at [facility] with complaints of L foot pain.  She presented with decreased range of motion, decreased strength, limited flexibility, altered gait pattern, poor balance, decreased tolerance to activity and decreased functional mobility following fusion of toes of left foot on 24 due to osteoarthritis of midfoot due to inflammatory arthritis.  Her progression has been excellent since starting PT despite weaning herself out of her CAM boot early against MD and PT advice.  Her gait pattern is now WNL, L ankle ROM is almost WNL and strength is very close to WNL.  She still has some plantar fascia tightness but a smaller area.  L medial ankle is still painful with forceful eversion.  She will continue to benefit from skilled PT services in order to address these deficits and reach maximum level of function.       Plan:  Continue to progress eversion ROM without medial ankle pain.  Progress balance L LE.            POC EXPIRES On:  24  PRECAUTIONS:  PWB L Foot - No bike due to limited knee ROM  CO-MORBIDITES:  B TKA  PERSONAL FACTORS:  None      Manuals HEP            L plantar fascia STM  8' 10'           PROM L ankle all motions  7' 5'           STM L ankle                            Neuro Re-Ed      Tandem walk 12' FWD on foam over high hurdles  4 laps - no hurdles 4 laps           Lunges onto bosu dome up - FWD + side L foot leading  20 ea            Mini squats  "on bosu dome down  20            L SLS on disc with R foot star excursion 3 points  Black 20 ea Black 20 ea                         Ther Ex     Toe flexion L 4/9 30 30           S/L L ankle inv/ev 4/9 3# 30 ea 3# 30 ea           B calf raises 4/16 30 30           Standing marching L/R on blue foam 4/16 20 20           Standing hip abd L/R on blue foam 4/16 20 20           Standing L gastroc stretch 4/19 20\" 3 20\" 3           Standing L soleus stretch 4/19 20\" 3 20\" 3           B leg press  105# 3x15 135# 3x15           Standing hip 4 way with TB L/R  Blue 20 ea Blue 20 ea                         Ther Activity                                 Gait Training     No AD                            Modalities                                     "

## 2024-05-24 ENCOUNTER — OFFICE VISIT (OUTPATIENT)
Dept: PHYSICAL THERAPY | Facility: CLINIC | Age: 64
End: 2024-05-24
Payer: COMMERCIAL

## 2024-05-24 DIAGNOSIS — M19.90 OSTEOARTHRITIS OF MIDFOOT DUE TO INFLAMMATORY ARTHRITIS: Primary | ICD-10-CM

## 2024-05-24 DIAGNOSIS — M19.279 OSTEOARTHRITIS OF MIDFOOT DUE TO INFLAMMATORY ARTHRITIS: Primary | ICD-10-CM

## 2024-05-24 DIAGNOSIS — Q70.22 FUSION OF TOES OF LEFT FOOT: ICD-10-CM

## 2024-05-24 PROCEDURE — 97110 THERAPEUTIC EXERCISES: CPT | Performed by: PHYSICAL THERAPIST

## 2024-05-24 PROCEDURE — 97112 NEUROMUSCULAR REEDUCATION: CPT | Performed by: PHYSICAL THERAPIST

## 2024-05-24 PROCEDURE — 97140 MANUAL THERAPY 1/> REGIONS: CPT | Performed by: PHYSICAL THERAPIST

## 2024-05-24 NOTE — PROGRESS NOTES
Daily Note     Today's date: 2024  Patient name: Julia Engelmann  : 1960  MRN: [id number]  Referring provider: Lachman, James R, MD  Dx:   Encounter Diagnosis     ICD-10-CM    1. 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 Fusion of toes of left foot  Q70.22                      Subjective:  Pt reports having foot soreness still but using her foot a lot.          Objective:  See treatment diary below      Assessment:  Pt presented to outpatient physical therapy at [facility] with complaints of L foot pain.  She presented with decreased range of motion, decreased strength, limited flexibility, altered gait pattern, poor balance, decreased tolerance to activity and decreased functional mobility following fusion of toes of left foot on 24 due to osteoarthritis of midfoot due to inflammatory arthritis.  Her progression has been excellent since starting PT despite weaning herself out of her CAM boot early against MD and PT advice.  Her gait pattern is now WNL, L ankle ROM is almost WNL and strength is very close to WNL.  She still has some plantar fascia tightness but a smaller area, but can be very sore still.  L medial ankle is still painful with forceful eversion.  Challenged with added proprioceptive tasks today but no increase in pain.  She will continue to benefit from skilled PT services in order to address these deficits and reach maximum level of function.       Plan:  Continue to progress eversion ROM without medial ankle pain.  Progress balance L LE.            POC EXPIRES On:  24  PRECAUTIONS:  PWB L Foot - No bike due to limited knee ROM  CO-MORBIDITES:  B TKA  PERSONAL FACTORS:  None      Manuals HEP           L plantar fascia STM  8' 10' 10'          PROM L ankle all motions  7' 5' 5'          STM L ankle                            Neuro Re-Ed      Tandem walk 12' FWD on foam over high hurdles  4 laps - no hurdles 4 laps          "  Lunges onto bosu dome up - FWD + side L foot leading  20 ea  20 ea          Mini squats on bosu dome down  20  20          L SLS on disc with R foot star excursion 3 points  Black 20 ea Black 20 ea Black 20 ea                        Ther Ex     Toe flexion L 4/9 30 30 30          S/L L ankle inv/ev 4/9 3# 30 ea 3# 30 ea 3# 30 ea          B calf raises 4/16 30 30 30          Marching on rocker board (lateral rock)    20 ea          Standing marching L/R on blue foam 4/16 20 20 D/C          Standing hip abd L/R on blue foam 4/16 20 20 D/C          Standing L/R hip abd on rocker board (lat rock)    20 ea          Standing L gastroc stretch 4/19 20\" 3 20\" 3 20\" 3          Standing L soleus stretch 4/19 20\" 3 20\" 3 20\" 3          B leg press  105# 3x15 135# 3x15 135# 3x15          Standing hip 4 way with TB L/R  Blue 20 ea Blue 20 ea Blue 20 ea                        Ther Activity                                 Gait Training     No AD                            Modalities                                     "

## 2024-05-28 ENCOUNTER — OFFICE VISIT (OUTPATIENT)
Dept: PHYSICAL THERAPY | Facility: CLINIC | Age: 64
End: 2024-05-28
Payer: COMMERCIAL

## 2024-05-28 DIAGNOSIS — M19.279 OSTEOARTHRITIS OF MIDFOOT DUE TO INFLAMMATORY ARTHRITIS: Primary | ICD-10-CM

## 2024-05-28 DIAGNOSIS — M19.90 OSTEOARTHRITIS OF MIDFOOT DUE TO INFLAMMATORY ARTHRITIS: Primary | ICD-10-CM

## 2024-05-28 DIAGNOSIS — Q70.22 FUSION OF TOES OF LEFT FOOT: ICD-10-CM

## 2024-05-28 PROCEDURE — 97112 NEUROMUSCULAR REEDUCATION: CPT | Performed by: PHYSICAL THERAPIST

## 2024-05-28 PROCEDURE — 97140 MANUAL THERAPY 1/> REGIONS: CPT | Performed by: PHYSICAL THERAPIST

## 2024-05-28 PROCEDURE — 97110 THERAPEUTIC EXERCISES: CPT | Performed by: PHYSICAL THERAPIST

## 2024-05-28 NOTE — PROGRESS NOTES
Daily Note     Today's date: 2024  Patient name: Julia Engelmann  : 1960  MRN: [id number]  Referring provider: Lachman, James R, MD  Dx:   Encounter Diagnosis     ICD-10-CM    1. 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 Fusion of toes of left foot  Q70.22                        Subjective:  Pt reports having much more mobility in her foot today.  Did some lateral rocking on the bosu yesterday and things loosened up in her foot and ankle.            Objective:  See treatment diary below      Assessment:  Pt presented to outpatient physical therapy at [facility] with complaints of L foot pain.  She presented with decreased range of motion, decreased strength, limited flexibility, altered gait pattern, poor balance, decreased tolerance to activity and decreased functional mobility following fusion of toes of left foot on 24 due to osteoarthritis of midfoot due to inflammatory arthritis.  Her progression has been excellent since starting PT despite weaning herself out of her CAM boot early against MD and PT advice.  Her gait pattern is now WNL, L ankle ROM is almost WNL and strength is very close to WNL.  She had less plantar fascia tightness today = minimal.  L medial ankle was not painful with forceful eversion for the first time today since her surgery.  She will continue to benefit from skilled PT services in order to address these deficits and reach maximum level of function.       Plan:  Continue to progress eversion ROM without medial ankle pain.  Progress balance L LE.  Pt away last week in July.         POC EXPIRES On:  24  PRECAUTIONS:  PWB L Foot - No bike due to limited knee ROM  CO-MORBIDITES:  B TKA  PERSONAL FACTORS:  None      Manuals HEP          L plantar fascia STM  8' 10' 10' 8'         PROM L ankle all motions  7' 5' 5' 7'         STM L ankle                            Neuro Re-Ed      Tandem walk 12' FWD on foam  "over high hurdles  4 laps - no hurdles 4 laps           Lunges onto bosu dome up - FWD + side L foot leading  20 ea  20 ea 20 ea         Mini squats on bosu dome down  20  20 20         Lateral rocking on bosu dome down     5'         L SLS on disc with R foot star excursion 3 points  Black 20 ea Black 20 ea Black 20 ea Black 20 ea                       Ther Ex     Toe flexion L 4/9 30 30 30 30         S/L L ankle inv/ev 4/9 3# 30 ea 3# 30 ea 3# 30 ea 3# 30 ea         B calf raises 4/16 30 30 30 30         Marching on rocker board (lateral rock)    20 ea 20 ea         Standing marching L/R on blue foam 4/16 20 20 D/C          Standing hip abd L/R on blue foam 4/16 20 20 D/C          Standing L/R hip abd on rocker board (lat rock)    20 ea 20 ea         Standing L gastroc stretch 4/19 20\" 3 20\" 3 20\" 3 20\" 3         Standing L soleus stretch 4/19 20\" 3 20\" 3 20\" 3 20\" 3         B leg press  105# 3x15 135# 3x15 135# 3x15 135# 3x15         Standing hip 4 way with TB L/R  Blue 20 ea Blue 20 ea Blue 20 ea Blue 20 ea                       Ther Activity     [place] stepping 24'     3 laps                       Gait Training     No AD                            Modalities                                     "

## 2024-05-31 ENCOUNTER — OFFICE VISIT (OUTPATIENT)
Dept: PHYSICAL THERAPY | Facility: CLINIC | Age: 64
End: 2024-05-31
Payer: COMMERCIAL

## 2024-05-31 DIAGNOSIS — M19.279 OSTEOARTHRITIS OF MIDFOOT DUE TO INFLAMMATORY ARTHRITIS: Primary | ICD-10-CM

## 2024-05-31 DIAGNOSIS — M19.90 OSTEOARTHRITIS OF MIDFOOT DUE TO INFLAMMATORY ARTHRITIS: Primary | ICD-10-CM

## 2024-05-31 DIAGNOSIS — Q70.22 FUSION OF TOES OF LEFT FOOT: ICD-10-CM

## 2024-05-31 PROCEDURE — 97140 MANUAL THERAPY 1/> REGIONS: CPT | Performed by: PHYSICAL THERAPIST

## 2024-05-31 PROCEDURE — 97110 THERAPEUTIC EXERCISES: CPT | Performed by: PHYSICAL THERAPIST

## 2024-05-31 PROCEDURE — 97112 NEUROMUSCULAR REEDUCATION: CPT | Performed by: PHYSICAL THERAPIST

## 2024-05-31 NOTE — PROGRESS NOTES
Daily Note     Today's date: 2024  Patient name: Julia Engelmann  : 1960  MRN: [id number]  Referring provider: Lachman, James R, MD  Dx:   Encounter Diagnosis     ICD-10-CM    1. 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 Fusion of toes of left foot  Q70.22                        Subjective:  Pt reports feeling much better today.  Had a massage yesterday and it helped but L heel was very sore.              Objective:  See treatment diary below      Assessment:  Pt presented to outpatient physical therapy at [facility] with complaints of L foot pain.  She presented with decreased range of motion, decreased strength, limited flexibility, altered gait pattern, poor balance, decreased tolerance to activity and decreased functional mobility following fusion of toes of left foot on 24 due to osteoarthritis of midfoot due to inflammatory arthritis.  Her progression has been excellent since starting PT despite weaning herself out of her CAM boot early against MD and PT advice.  Her gait pattern is now WNL, L ankle ROM is almost WNL and strength is very close to WNL.  She has had less plantar fascia tightness this week with min tightness at most.  Even toe mobility was improved.  L medial ankle continues to not be painful with forceful eversion this week.  Better tolerance to [place] step today due to improving mobility.  She will continue to benefit from skilled PT services in order to address these deficits and reach maximum level of function.       Plan:  Continue to progress eversion ROM without medial ankle pain.  Progress balance L LE.  Pt away last week in July.         POC EXPIRES On:  24  PRECAUTIONS:  PWB L Foot - No bike due to limited knee ROM  CO-MORBIDITES:  B TKA  PERSONAL FACTORS:  None      Manuals HEP         L plantar fascia STM  8' 10' 10' 8' 5'        PROM L ankle all motions  7' 5' 5' 7' 5'        STM L ankle        "                     Neuro Re-Ed      Tandem walk 12' FWD on foam over high hurdles  4 laps - no hurdles 4 laps           Lunges onto bosu dome up - FWD + side L foot leading  20 ea  20 ea 20 ea 20 ea        Mini squats on bosu dome down  20  20 20 20        Lateral rocking on bosu dome down     5' 3'        L SLS on disc with R foot star excursion 3 points  Black 20 ea Black 20 ea Black 20 ea Black 20 ea Black 20 ea                      Ther Ex     Toe flexion L 4/9 30 30 30 30 30        S/L L ankle inv/ev 4/9 3# 30 ea 3# 30 ea 3# 30 ea 3# 30 ea 4# 30 ea        B calf raises 4/16 30 30 30 30 30        Marching on rocker board (lateral rock)    20 ea 20 ea 20 ea        Standing marching L/R on blue foam 4/16 20 20 D/C          Standing hip abd L/R on blue foam 4/16 20 20 D/C          Standing L/R hip abd on rocker board (lat rock)    20 ea 20 ea 20 ea        Standing L gastroc stretch 4/19 20\" 3 20\" 3 20\" 3 20\" 3 20\" 3        Standing L soleus stretch 4/19 20\" 3 20\" 3 20\" 3 20\" 3 20\" 3        B leg press  105# 3x15 135# 3x15 135# 3x15 135# 3x15 135# 3x15        Standing hip 4 way with TB L/R  Blue 20 ea Blue 20 ea Blue 20 ea Blue 20 ea Blue 20 ea                      Ther Activity     [place] stepping 24'     3 laps 3 laps                      Gait Training     No AD                            Modalities                                     "

## 2024-06-03 ENCOUNTER — OFFICE VISIT (OUTPATIENT)
Dept: PHYSICAL THERAPY | Facility: CLINIC | Age: 64
End: 2024-06-03
Payer: COMMERCIAL

## 2024-06-03 DIAGNOSIS — M19.90 OSTEOARTHRITIS OF MIDFOOT DUE TO INFLAMMATORY ARTHRITIS: Primary | ICD-10-CM

## 2024-06-03 DIAGNOSIS — M19.279 OSTEOARTHRITIS OF MIDFOOT DUE TO INFLAMMATORY ARTHRITIS: Primary | ICD-10-CM

## 2024-06-03 DIAGNOSIS — Q70.22 FUSION OF TOES OF LEFT FOOT: ICD-10-CM

## 2024-06-03 PROCEDURE — 97112 NEUROMUSCULAR REEDUCATION: CPT | Performed by: PHYSICAL THERAPIST

## 2024-06-03 PROCEDURE — 97110 THERAPEUTIC EXERCISES: CPT | Performed by: PHYSICAL THERAPIST

## 2024-06-03 PROCEDURE — 97140 MANUAL THERAPY 1/> REGIONS: CPT | Performed by: PHYSICAL THERAPIST

## 2024-06-03 NOTE — PROGRESS NOTES
Daily Note     Today's date: 6/3/2024  Patient name: Julia Engelmann  : 1960  MRN: [id number]  Referring provider: Lachman, James R, MD  Dx:   Encounter Diagnosis     ICD-10-CM    1. 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 Fusion of toes of left foot  Q70.22                          Subjective:  Pt reports feeling fine after increased weights last session.  Less sore today.               Objective:  See treatment diary below      Assessment:  Pt presented to outpatient physical therapy at [facility] with complaints of L foot pain.  She presented with decreased range of motion, decreased strength, limited flexibility, altered gait pattern, poor balance, decreased tolerance to activity and decreased functional mobility following fusion of toes of left foot on 24 due to osteoarthritis of midfoot due to inflammatory arthritis.  Her progression has been excellent since starting PT despite weaning herself out of her CAM boot early against MD and PT advice.  Her gait pattern is now WNL, L ankle ROM is almost WNL and strength is very close to WNL.  She has had less plantar fascia tightness each day in the past 2 weeks.  Even toe mobility was improved.  L medial ankle continues to not be painful with forceful eversion.  She will continue to benefit from skilled PT services in order to address these deficits and reach maximum level of function.       Plan:  Continue to progress eversion ROM without medial ankle pain.  Progress balance L LE.  Pt away last week in July.         POC EXPIRES On:  24  PRECAUTIONS:  PWB L Foot - No bike due to limited knee ROM  CO-MORBIDITES:  B TKA  PERSONAL FACTORS:  None      Manuals HEP  6/3       L plantar fascia STM  8' 10' 10' 8' 5' 5'       PROM L ankle all motions  7' 5' 5' 7' 5' 5'       STM L ankle                            Neuro Re-Ed      Tandem walk 12' FWD on foam over high hurdles  4 laps - no  "hurdles 4 laps           Lunges onto bosu dome up - FWD + side L foot leading  20 ea  20 ea 20 ea 20 ea 20 ea       Mini squats on bosu dome down  20  20 20 20 20       Lateral rocking on bosu dome down     5' 3' 3'       L SLS on disc with R foot star excursion 3 points  Black 20 ea Black 20 ea Black 20 ea Black 20 ea Black 20 ea Black 20 ea                     Ther Ex     Toe flexion L 4/9 30 30 30 30 30 30       S/L L ankle inv/ev 4/9 3# 30 ea 3# 30 ea 3# 30 ea 3# 30 ea 4# 30 ea 4# 30 ea       B calf raises 4/16 30 30 30 30 30 30       Marching on rocker board (lateral rock)    20 ea 20 ea 20 ea 20ea       Standing marching L/R on blue foam 4/16 20 20 D/C          Standing hip abd L/R on blue foam 4/16 20 20 D/C          Standing L/R hip abd on rocker board (lat rock)    20 ea 20 ea 20 ea 20 ea       Standing L gastroc stretch 4/19 20\" 3 20\" 3 20\" 3 20\" 3 20\" 3 20\" 3       Standing L soleus stretch 4/19 20\" 3 20\" 3 20\" 3 20\" 3 20\" 3 20\" 3       B leg press  105# 3x15 135# 3x15 135# 3x15 135# 3x15 135# 3x15 135# 3x15       Standing hip 4 way with TB L/R  Blue 20 ea Blue 20 ea Blue 20 ea Blue 20 ea Blue 20 ea Blue 20 ea                     Ther Activity     [place] stepping 24'     3 laps 3 laps 3 laps                     Gait Training     No AD                            Modalities                                     "

## 2024-06-07 ENCOUNTER — OFFICE VISIT (OUTPATIENT)
Dept: PHYSICAL THERAPY | Facility: CLINIC | Age: 64
End: 2024-06-07
Payer: COMMERCIAL

## 2024-06-07 DIAGNOSIS — Q70.22 FUSION OF TOES OF LEFT FOOT: ICD-10-CM

## 2024-06-07 DIAGNOSIS — M19.279 OSTEOARTHRITIS OF MIDFOOT DUE TO INFLAMMATORY ARTHRITIS: Primary | ICD-10-CM

## 2024-06-07 DIAGNOSIS — M19.90 OSTEOARTHRITIS OF MIDFOOT DUE TO INFLAMMATORY ARTHRITIS: Primary | ICD-10-CM

## 2024-06-07 PROCEDURE — 97112 NEUROMUSCULAR REEDUCATION: CPT | Performed by: PHYSICAL THERAPIST

## 2024-06-07 PROCEDURE — 97110 THERAPEUTIC EXERCISES: CPT | Performed by: PHYSICAL THERAPIST

## 2024-06-07 PROCEDURE — 97140 MANUAL THERAPY 1/> REGIONS: CPT | Performed by: PHYSICAL THERAPIST

## 2024-06-07 NOTE — PROGRESS NOTES
Daily Note     Today's date: 2024  Patient name: Julia Engelmann  : 1960  MRN: [id number]  Referring provider: Lachman, James R, MD  Dx:   Encounter Diagnosis     ICD-10-CM    1. 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 Fusion of toes of left foot  Q70.22                            Subjective:  Pt reports feeling fine after increased weights last session.  Less sore today.               Objective:  See treatment diary below      Assessment:  Pt presented to outpatient physical therapy at [facility] with complaints of L foot pain.  She presented with decreased range of motion, decreased strength, limited flexibility, altered gait pattern, poor balance, decreased tolerance to activity and decreased functional mobility following fusion of toes of left foot on 24 due to osteoarthritis of midfoot due to inflammatory arthritis.  Her progression has been excellent since starting PT despite weaning herself out of her CAM boot early against MD and PT advice.  Her gait pattern is now WNL, L ankle ROM is almost WNL and strength is very close to WNL.  She has had less plantar fascia tightness each day in the past few weeks so that it is only minimal now at the lateral aspect of her foot.  Even toe mobility was improved with only restriction now of 2nd toe.  L medial ankle continues to not be painful with forceful eversion.  She will continue to benefit from skilled PT services in order to address these deficits and reach maximum level of function.       Plan:  Continue to progress eversion ROM without medial ankle pain.  Progress balance L LE.  Pt away last week in July.  Likely D/C on 24.        POC EXPIRES On:  24  PRECAUTIONS:  PWB L Foot - No bike due to limited knee ROM  CO-MORBIDITES:  B TKA  PERSONAL FACTORS:  None      Manuals HEP 5/17 5/21 5/24 5/28 5/31 6/3 6/7      L plantar fascia STM  8' 10' 10' 8' 5' 5' 5'      PROM L ankle all motions  7' 5' 5'  "7' 5' 5' 5'      STM L ankle                            Neuro Re-Ed      Tandem walk 12' FWD on foam over high hurdles  4 laps - no hurdles 4 laps           Lunges onto bosu dome up - FWD + side L foot leading  20 ea  20 ea 20 ea 20 ea 20 ea 20 ea      Mini squats on bosu dome down  20  20 20 20 20 20      Lateral rocking on bosu dome down     5' 3' 3' 3'      L SLS on disc with R foot star excursion 3 points  Black 20 ea Black 20 ea Black 20 ea Black 20 ea Black 20 ea Black 20 ea Black 20 ea                    Ther Ex     Toe flexion L 4/9 30 30 30 30 30 30 30      S/L L ankle inv/ev 4/9 3# 30 ea 3# 30 ea 3# 30 ea 3# 30 ea 4# 30 ea 4# 30 ea 4# 30 ea      B calf raises 4/16 30 30 30 30 30 30 30      Marching on rocker board (lateral rock)    20 ea 20 ea 20 ea 20ea 20 ea      Standing marching L/R on blue foam 4/16 20 20 D/C          Standing hip abd L/R on blue foam 4/16 20 20 D/C          Standing L/R hip abd on rocker board (lat rock)    20 ea 20 ea 20 ea 20 ea 20 ea      Standing L gastroc stretch 4/19 20\" 3 20\" 3 20\" 3 20\" 3 20\" 3 20\" 3 20\" 3      Standing L soleus stretch 4/19 20\" 3 20\" 3 20\" 3 20\" 3 20\" 3 20\" 3 20\" 3      B leg press  105# 3x15 135# 3x15 135# 3x15 135# 3x15 135# 3x15 135# 3x15 135# 3x15      Standing hip 4 way with TB L/R  Blue 20 ea Blue 20 ea Blue 20 ea Blue 20 ea Blue 20 ea Blue 20 ea Blue 20 ea                    Ther Activity     [place] stepping 24'     3 laps 3 laps 3 laps 3 laps                    Gait Training     No AD                            Modalities                                     "

## 2024-06-11 ENCOUNTER — OFFICE VISIT (OUTPATIENT)
Dept: PHYSICAL THERAPY | Facility: CLINIC | Age: 64
End: 2024-06-11
Payer: COMMERCIAL

## 2024-06-11 DIAGNOSIS — Q70.22 FUSION OF TOES OF LEFT FOOT: ICD-10-CM

## 2024-06-11 DIAGNOSIS — M19.279 OSTEOARTHRITIS OF MIDFOOT DUE TO INFLAMMATORY ARTHRITIS: Primary | ICD-10-CM

## 2024-06-11 DIAGNOSIS — M19.90 OSTEOARTHRITIS OF MIDFOOT DUE TO INFLAMMATORY ARTHRITIS: Primary | ICD-10-CM

## 2024-06-11 PROCEDURE — 97112 NEUROMUSCULAR REEDUCATION: CPT | Performed by: PHYSICAL THERAPIST

## 2024-06-11 PROCEDURE — 97140 MANUAL THERAPY 1/> REGIONS: CPT | Performed by: PHYSICAL THERAPIST

## 2024-06-11 NOTE — PROGRESS NOTES
Daily Note     Today's date: 2024  Patient name: Julia Engelmann  : 1960  MRN: [id number]  Referring provider: Lachman, James R, MD  Dx:   Encounter Diagnosis     ICD-10-CM    1. 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 Fusion of toes of left foot  Q70.22                              Subjective:  Pt reports feeling better this morning.  Less sore.               Objective:  See treatment diary below      Assessment:  Pt presented to outpatient physical therapy at [facility] with complaints of L foot pain.  She presented with decreased range of motion, decreased strength, limited flexibility, altered gait pattern, poor balance, decreased tolerance to activity and decreased functional mobility following fusion of toes of left foot on 24 due to osteoarthritis of midfoot due to inflammatory arthritis.  Her progression has been excellent since starting PT despite weaning herself out of her CAM boot early against MD and PT advice.  Her gait pattern is now WNL, L ankle ROM is almost WNL and strength is very close to WNL.  She has had less plantar fascia tightness each day in the past few weeks so that it is only minimal now at the lateral aspect of her foot.  Even toe mobility was improved with only restriction now of 2nd toe following mobs that were added in the past week.  L medial ankle continues to not be painful with forceful eversion.  She will continue to benefit from skilled PT services in order to address these deficits and reach maximum level of function.       Plan:  Continue to progress eversion ROM without medial ankle pain.  Continue mobs for toes.  Progress balance L LE.  Pt away last week in July.  Likely D/C on 24.        POC EXPIRES On:  24  PRECAUTIONS:  PWB L Foot - No bike due to limited knee ROM  CO-MORBIDITES:  B TKA  PERSONAL FACTORS:  None      Manuals HEP 5/17 5/21 5/24 5/28 5/31 6/3 6/7 6/11     L plantar fascia STM  8' 10' 10'  "8' 5' 5' 5' 5'     PROM L ankle all motions  7' 5' 5' 7' 5' 5' 5' 5'     Jt mobs grade 2-3 L 2nd and 3rd toes         2'     Toe flex/ext PROM L         2'     Neuro Re-Ed      Tandem walk 12' FWD on foam over high hurdles  4 laps - no hurdles 4 laps           Lunges onto bosu dome up - FWD + side L foot leading  20 ea  20 ea 20 ea 20 ea 20 ea 20 ea 20 ea     Mini squats on bosu dome down  20  20 20 20 20 20 20     Lateral rocking on bosu dome down     5' 3' 3' 3' 3'     L SLS on disc with R foot star excursion 3 points  Black 20 ea Black 20 ea Black 20 ea Black 20 ea Black 20 ea Black 20 ea Black 20 ea Black 20 ea                   Ther Ex     Toe flexion L 4/9 30 30 30 30 30 30 30 30     S/L L ankle inv/ev 4/9 3# 30 ea 3# 30 ea 3# 30 ea 3# 30 ea 4# 30 ea 4# 30 ea 4# 30 ea 4# 30 ea     B calf raises 4/16 30 30 30 30 30 30 30 30     Marching on rocker board (lateral rock)    20 ea 20 ea 20 ea 20ea 20 ea 20 ea     Standing marching L/R on blue foam 4/16 20 20 D/C          Standing hip abd L/R on blue foam 4/16 20 20 D/C          Standing L/R hip abd on rocker board (lat rock)    20 ea 20 ea 20 ea 20 ea 20 ea 20 ea     Standing L gastroc stretch 4/19 20\" 3 20\" 3 20\" 3 20\" 3 20\" 3 20\" 3 20\" 3 20\" 3     Standing L soleus stretch 4/19 20\" 3 20\" 3 20\" 3 20\" 3 20\" 3 20\" 3 20\" 3 20\" 3     B leg press  105# 3x15 135# 3x15 135# 3x15 135# 3x15 135# 3x15 135# 3x15 135# 3x15 135# 3x15     Standing hip 4 way with TB L/R  Blue 20 ea Blue 20 ea Blue 20 ea Blue 20 ea Blue 20 ea Blue 20 ea Blue 20 ea Blue 20 ea                   Ther Activity     [place] stepping 24'     3 laps 3 laps 3 laps 3 laps 3 laps                   Gait Training     No AD                            Modalities                                     "

## 2024-06-14 ENCOUNTER — OFFICE VISIT (OUTPATIENT)
Dept: PHYSICAL THERAPY | Facility: CLINIC | Age: 64
End: 2024-06-14
Payer: COMMERCIAL

## 2024-06-14 DIAGNOSIS — M19.90 OSTEOARTHRITIS OF MIDFOOT DUE TO INFLAMMATORY ARTHRITIS: Primary | ICD-10-CM

## 2024-06-14 DIAGNOSIS — Q70.22 FUSION OF TOES OF LEFT FOOT: ICD-10-CM

## 2024-06-14 DIAGNOSIS — M19.279 OSTEOARTHRITIS OF MIDFOOT DUE TO INFLAMMATORY ARTHRITIS: Primary | ICD-10-CM

## 2024-06-14 PROCEDURE — 97140 MANUAL THERAPY 1/> REGIONS: CPT | Performed by: PHYSICAL THERAPIST

## 2024-06-14 PROCEDURE — 97112 NEUROMUSCULAR REEDUCATION: CPT | Performed by: PHYSICAL THERAPIST

## 2024-06-14 NOTE — PROGRESS NOTES
Daily Note + D/C    Today's date: 2024  Patient name: Julia Engelmann  : 1960  MRN: [id number]  Referring provider: Lachman, James R, MD  Dx:   Encounter Diagnosis     ICD-10-CM    1. 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 Fusion of toes of left foot  Q70.22                                Subjective:  Pt reports feeling good.  Ready for D/C.               Objective:  See treatment diary below      Assessment:  Pt presented to outpatient physical therapy at [facility] with complaints of L foot pain.  She presented with decreased range of motion, decreased strength, limited flexibility, altered gait pattern, poor balance, decreased tolerance to activity and decreased functional mobility following fusion of toes of left foot on 24 due to osteoarthritis of midfoot due to inflammatory arthritis.  Her progression has been excellent since starting PT despite weaning herself out of her CAM boot early against MD and PT advice.  Her gait pattern is now WNL, L ankle ROM is almost WNL aside from eversion with better toe motion but still min limited in flex of 2nd and 3rd toes.  Strength is very close to WNL in all motions with only slight plantar fascia tightness.  She has met her goals and feels ready for D/C at this time.         Plan:  D/C PT.          POC EXPIRES On:  24  PRECAUTIONS:  PWB L Foot - No bike due to limited knee ROM  CO-MORBIDITES:  B TKA  PERSONAL FACTORS:  None      Manuals HEP  6/3 6    L plantar fascia STM  8' 10' 10' 8' 5' 5' 5' 5' 5'    PROM L ankle all motions  7' 5' 5' 7' 5' 5' 5' 5' 5'    Jt mobs grade 2-3 L 2nd and 3rd toes         2' 2'    Toe flex/ext PROM L         2' 2'    Neuro Re-Ed      Tandem walk 12' FWD on foam over high hurdles  4 laps - no hurdles 4 laps           Lunges onto bosu dome up - FWD + side L foot leading  20 ea  20 ea 20 ea 20 ea 20 ea 20 ea 20 ea 20 ea    Mini squats on  "bosu dome down  20  20 20 20 20 20 20 20    Lateral rocking on bosu dome down     5' 3' 3' 3' 3' 3'    L SLS on disc with R foot star excursion 3 points  Black 20 ea Black 20 ea Black 20 ea Black 20 ea Black 20 ea Black 20 ea Black 20 ea Black 20 ea Black 20 ea                  Ther Ex     Toe flexion L 4/9 30 30 30 30 30 30 30 30 30    S/L L ankle inv/ev 4/9 3# 30 ea 3# 30 ea 3# 30 ea 3# 30 ea 4# 30 ea 4# 30 ea 4# 30 ea 4# 30 ea 4# 30 ea    B calf raises 4/16 30 30 30 30 30 30 30 30 30    Marching on rocker board (lateral rock)    20 ea 20 ea 20 ea 20ea 20 ea 20 ea 20 ea    Standing marching L/R on blue foam 4/16 20 20 D/C          Standing hip abd L/R on blue foam 4/16 20 20 D/C          Standing L/R hip abd on rocker board (lat rock)    20 ea 20 ea 20 ea 20 ea 20 ea 20 ea 20 ea    Standing L gastroc stretch 4/19 20\" 3 20\" 3 20\" 3 20\" 3 20\" 3 20\" 3 20\" 3 20\" 3 20\" 3    Standing L soleus stretch 4/19 20\" 3 20\" 3 20\" 3 20\" 3 20\" 3 20\" 3 20\" 3 20\" 3 20\" 3    B leg press  105# 3x15 135# 3x15 135# 3x15 135# 3x15 135# 3x15 135# 3x15 135# 3x15 135# 3x15 135# 3x15    Standing hip 4 way with TB L/R  Blue 20 ea Blue 20 ea Blue 20 ea Blue 20 ea Blue 20 ea Blue 20 ea Blue 20 ea Blue 20 ea Blue 20 ea                  Ther Activity     [place] stepping 24'     3 laps 3 laps 3 laps 3 laps 3 laps 3 laps                  Gait Training     No AD                            Modalities                                     "

## 2024-06-17 ENCOUNTER — APPOINTMENT (OUTPATIENT)
Dept: PHYSICAL THERAPY | Facility: CLINIC | Age: 64
End: 2024-06-17
Payer: COMMERCIAL

## 2024-06-20 ENCOUNTER — APPOINTMENT (OUTPATIENT)
Dept: PHYSICAL THERAPY | Facility: CLINIC | Age: 64
End: 2024-06-20
Payer: COMMERCIAL

## 2024-08-07 ENCOUNTER — OFFICE VISIT (OUTPATIENT)
Dept: OBGYN CLINIC | Facility: CLINIC | Age: 64
End: 2024-08-07
Payer: COMMERCIAL

## 2024-08-07 ENCOUNTER — APPOINTMENT (OUTPATIENT)
Dept: RADIOLOGY | Facility: AMBULARY SURGERY CENTER | Age: 64
End: 2024-08-07
Attending: ORTHOPAEDIC SURGERY
Payer: COMMERCIAL

## 2024-08-07 VITALS
DIASTOLIC BLOOD PRESSURE: 70 MMHG | SYSTOLIC BLOOD PRESSURE: 140 MMHG | HEART RATE: 70 BPM | BODY MASS INDEX: 24.35 KG/M2 | WEIGHT: 124 LBS | HEIGHT: 60 IN

## 2024-08-07 DIAGNOSIS — M19.90 OSTEOARTHRITIS OF MIDFOOT DUE TO INFLAMMATORY ARTHRITIS: ICD-10-CM

## 2024-08-07 DIAGNOSIS — Z01.89 ENCOUNTER FOR LOWER EXTREMITY COMPARISON IMAGING STUDY: ICD-10-CM

## 2024-08-07 DIAGNOSIS — M19.90 OSTEOARTHRITIS OF MIDFOOT DUE TO INFLAMMATORY ARTHRITIS: Primary | ICD-10-CM

## 2024-08-07 DIAGNOSIS — M19.279 OSTEOARTHRITIS OF MIDFOOT DUE TO INFLAMMATORY ARTHRITIS: ICD-10-CM

## 2024-08-07 DIAGNOSIS — M19.279 OSTEOARTHRITIS OF MIDFOOT DUE TO INFLAMMATORY ARTHRITIS: Primary | ICD-10-CM

## 2024-08-07 PROCEDURE — 73630 X-RAY EXAM OF FOOT: CPT

## 2024-08-07 PROCEDURE — 73620 X-RAY EXAM OF FOOT: CPT

## 2024-08-07 PROCEDURE — 99213 OFFICE O/P EST LOW 20 MIN: CPT | Performed by: ORTHOPAEDIC SURGERY

## 2024-08-07 NOTE — PROGRESS NOTES
James R Lachman, M.D.  Attending, Orthopaedic Surgery  Foot and Ankle  [facility]      ORTHOPAEDIC FOOT AND ANKLE CLINIC VISIT     Assessment:     Encounter Diagnoses   Name Primary?    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 We engaged in the shared decision-making process and treatment options were discussed at length with the patient. Surgical and conservative management discussed today along with risks and benefits.  Patient is 6 months s/p left 2nd and 3rd TMT and naviculocuneiform joint fusions with 4th and 5th TMT joint interpositional arthroplasties  Continue HEP as directed  Compression stocking for swelling control   Ice and elevation for pain control  Patient has no restrictions and is stable from an orthopedic standpoint  Return if symptoms worsen or fail to improve.      History of Present Illness:   Chief Complaint:   Chief Complaint   Patient presents with    Follow-up     Follow up of the left foot. 2 weeks ago [place] the beach.      Julia Engelmann is a 63 y.o. female who is being seen in follow-up for above procedure. When we last saw she we recommended WBAT in sneaker.  Pain has improved. Residual pain is localized at incision site with minimal radiating and described as sharp and severe.      Pain/symptom timing:  Worse during the day when active  Pain/symptom context:  Worse with activites and work  Pain/symptom modifying factors:  Rest makes better, activities make worse  Pain/symptom associated signs/symptoms: none    Prior treatment   NSAIDsYes   Injections Yes   Bracing/Orthotics Yes    Physical Therapy Yes     Orthopedic Surgical History:   See below    Past Medical, Surgical and Social History:  Past Medical History:  has a past medical history of Arthritis, Celiac  disease, Exercises daily, and PONV (postoperative nausea and vomiting).  Problem List: does not have any pertinent problems on file.  Past Surgical History:  has a past surgical history that includes Knee surgery (Right, 2015); Knee Arthroplasty (Left, 2014); FL guided needle plac bx/asp/inj (04/25/2023); FL guided needle plac bx/asp/inj (08/08/2023); Colonoscopy; EGD; Dilation and curettage of uterus; and pr arthrd midtarsl/tarsometatarsal mult/transvrs (Left, 2/19/2024).  Family History: family history includes Diabetes in her father; Heart attack in her father; Hypertension in her father and mother; Throat cancer in her mother.  Social History:  reports that she has never smoked. She has never used smokeless tobacco. She reports current alcohol use. She reports that she does not use drugs.  Current Medications: has a current medication list which includes the following prescription(s): biotin, calcium-magnesium-vitamin d, cetirizine, diclofenac, magnesium, metoprolol succinate, and fish oil.  Allergies: is allergic to acyclovir, demerol [meperidine], gluten meal - food allergy, percocet [oxycodone-acetaminophen], tetracycline, and oxycodone.     Review of Systems:  General- denies fever/chills  HEENT- denies hearing loss or sore throat  Eyes- denies eye pain or visual disturbances, denies red eyes  Respiratory- denies cough or SOB  Cardio- denies chest pain or palpitations  GI- denies abdominal pain  Endocrine- denies urinary frequency  Urinary- denies pain with urination  Musculoskeletal- Negative except noted above  Skin- denies rashes or wounds  Neurological- denies dizziness or headache  Psychiatric- denies anxiety or difficulty concentrating    Physical Exam:   There were no vitals taken for this visit.  General/Constitutional: No apparent distress: well-nourished and well developed.  Eyes: normal ocular motion  Lymphatic: No appreciable lymphadenopathy  Respiratory: Non-labored breathing  Vascular: No  edema, swelling or tenderness, except as noted in detailed exam.  Integumentary: No impressive skin lesions present, except as noted in detailed exam.  Neuro: No ataxia or tremors noted  Psych: Normal mood and affect, oriented to person, place and time. Appropriate affect.  Musculoskeletal: Normal, except as noted in detailed exam and in HPI.    Examination    Left      Gait Normal   Musculoskeletal NTTP    Skin Normal.  Well-healed incisions.    Nails Normal    Range of Motion  20 degrees dorsiflexion, 30 degrees plantarflexion  Subtalar motion: normal    Stability Stable    Muscle Strength 5/5 tibialis anterior  5/5 gastrocnemius-soleus  5/5 posterior tibialis  5/5 peroneal/eversion strength  5/5 EHL  5/5 FHL    Neurologic Normal    Sensation Intact to light touch throughout sural, saphenous, superficial peroneal, deep peroneal and medial/lateral plantar nerve distributions.  [place] 5.07 filament (10g) testing  deferred.    Cardiovascular Brisk capillary refill < 2 seconds,intact DP and PT pulses    Special Tests None      Imaging Studies:   3 views of the left foot were obtained, reviewed and interpreted independently which demonstrate hardware in expected position without signs of failure or loosening. Reviewed by me personally.      Scribe Attestation      I,:  Ian Lares am acting as a scribe while in the presence of the attending physician.:       I,:  James R Lachman, MD personally performed the services described in this documentation    as scribed in my presence.:                 James R. Lachman, MD  Foot & Ankle Surgery   Department of Orthopaedic Surgery  [facility]      I personally performed the service.    James R. Lachman, MD

## 2024-08-12 ENCOUNTER — OFFICE VISIT (OUTPATIENT)
Dept: PODIATRY | Facility: CLINIC | Age: 64
End: 2024-08-12
Payer: COMMERCIAL

## 2024-08-12 VITALS
HEIGHT: 60 IN | BODY MASS INDEX: 24.74 KG/M2 | DIASTOLIC BLOOD PRESSURE: 62 MMHG | WEIGHT: 126 LBS | SYSTOLIC BLOOD PRESSURE: 124 MMHG

## 2024-08-12 DIAGNOSIS — M79.672 PAIN IN LEFT FOOT: ICD-10-CM

## 2024-08-12 DIAGNOSIS — Q82.8 POROKERATOSIS: Primary | ICD-10-CM

## 2024-08-12 DIAGNOSIS — M19.072 OSTEOARTHRITIS OF LEFT ANKLE OR FOOT: ICD-10-CM

## 2024-08-12 DIAGNOSIS — Z98.890 S/P FOOT SURGERY, LEFT: ICD-10-CM

## 2024-08-12 PROCEDURE — 99213 OFFICE O/P EST LOW 20 MIN: CPT | Performed by: PODIATRIST

## 2024-08-13 NOTE — PROGRESS NOTES
Assessment/Plan:   Severity of lesion left foot since having surgical intervention involving her midfoot arthritis.  It appears her arthritis was forcing her to walk more on the outside of her foot which then aggravated this lesion but since the arthrodesis procedure has been completed and a different gait pattern has resulted in diminishing pain of this lesion.  Discussed further treatment options but at this point would only recommend continuing with supportive orthotic shoe gear and avoid any surgical excision.  Follow-up as needed.     Diagnoses and all orders for this visit:    Porokeratosis    Pain in left foot    Osteoarthritis of left ankle or foot    S/P foot surgery, left          Subjective:     Patient ID: Julia Engelmann is a 63 y.o. female.    HPI    Review of Systems   Musculoskeletal:  Positive for arthralgias (Left midfoot).   Skin:         Painful lesion has diminished in size left.  Healed satisfactorily         Objective:     Physical Exam  Constitutional:       Appearance: Normal appearance.   HENT:      Head: Normocephalic.      Right Ear: External ear normal.      Left Ear: External ear normal.   Eyes:      Pupils: Pupils are equal, round, and reactive to light.   Cardiovascular:      Rate and Rhythm: Normal rate.      Pulses:           Dorsalis pedis pulses are 2+ on the right side and 2+ on the left side.        Posterior tibial pulses are 1+ on the right side and 1+ on the left side.   Pulmonary:      Effort: Pulmonary effort is normal.   Musculoskeletal:         General: Swelling and tenderness present.      Cervical back: Normal range of motion.      Comments: Substantially less pain/discomfort at this point with healing arthrodeses.  Anticipate less arthritic pain into the future.   Feet:      Right foot:      Protective Sensation: 10 sites tested.  10 sites sensed.      Skin integrity: Skin integrity normal.      Left foot:      Protective Sensation: 10 sites tested.  10 sites sensed.       Skin integrity: Callus present.   Skin:     General: Skin is warm and dry.      Capillary Refill: Capillary refill takes 2 to 3 seconds.      Findings: Lesion present.      Comments: Left foot: Lateral plantar midfoot nucleated hyperkeratotic lesion consistent with a porokeratosis has diminished in size and is 50% less tender than it usually has been in the past.   Neurological:      General: No focal deficit present.      Mental Status: She is alert.   Psychiatric:         Mood and Affect: Mood normal.

## 2025-06-23 ENCOUNTER — OFFICE VISIT (OUTPATIENT)
Dept: URGENT CARE | Facility: CLINIC | Age: 65
End: 2025-06-23
Payer: COMMERCIAL

## 2025-06-23 ENCOUNTER — APPOINTMENT (OUTPATIENT)
Dept: RADIOLOGY | Facility: HOSPITAL | Age: 65
End: 2025-06-23
Payer: COMMERCIAL

## 2025-06-23 ENCOUNTER — HOSPITAL ENCOUNTER (EMERGENCY)
Facility: HOSPITAL | Age: 65
Discharge: HOME | End: 2025-06-23
Attending: EMERGENCY MEDICINE | Admitting: EMERGENCY MEDICINE
Payer: COMMERCIAL

## 2025-06-23 ENCOUNTER — APPOINTMENT (EMERGENCY)
Dept: CT IMAGING | Facility: HOSPITAL | Age: 65
End: 2025-06-23
Payer: COMMERCIAL

## 2025-06-23 VITALS
OXYGEN SATURATION: 98 % | HEART RATE: 56 BPM | SYSTOLIC BLOOD PRESSURE: 155 MMHG | TEMPERATURE: 97.5 F | RESPIRATION RATE: 18 BRPM | DIASTOLIC BLOOD PRESSURE: 75 MMHG

## 2025-06-23 VITALS
HEART RATE: 68 BPM | RESPIRATION RATE: 18 BRPM | SYSTOLIC BLOOD PRESSURE: 138 MMHG | OXYGEN SATURATION: 99 % | TEMPERATURE: 96.9 F | DIASTOLIC BLOOD PRESSURE: 96 MMHG

## 2025-06-23 DIAGNOSIS — M54.6 ACUTE BILATERAL THORACIC BACK PAIN: ICD-10-CM

## 2025-06-23 DIAGNOSIS — Z75.8 DOES NOT HAVE PRIMARY CARE PROVIDER: ICD-10-CM

## 2025-06-23 DIAGNOSIS — R07.89 ATYPICAL CHEST PAIN: Primary | ICD-10-CM

## 2025-06-23 DIAGNOSIS — E04.1 THYROID NODULE: ICD-10-CM

## 2025-06-23 DIAGNOSIS — I10 HYPERTENSION, UNSPECIFIED TYPE: Primary | ICD-10-CM

## 2025-06-23 DIAGNOSIS — I10 HYPERTENSION: ICD-10-CM

## 2025-06-23 LAB
ALBUMIN SERPL BCG-MCNC: 4.4 G/DL (ref 3.5–5)
ALP SERPL-CCNC: 109 U/L (ref 34–104)
ALT SERPL W P-5'-P-CCNC: 13 U/L (ref 7–52)
ANION GAP SERPL CALCULATED.3IONS-SCNC: 6 MMOL/L (ref 4–13)
AST SERPL W P-5'-P-CCNC: 17 U/L (ref 13–39)
BASOPHILS # BLD AUTO: 0.03 THOUSANDS/ÂΜL (ref 0–0.1)
BASOPHILS NFR BLD AUTO: 1 % (ref 0–1)
BILIRUB SERPL-MCNC: 0.49 MG/DL (ref 0.2–1)
BUN SERPL-MCNC: 16 MG/DL (ref 5–25)
CALCIUM SERPL-MCNC: 9.5 MG/DL (ref 8.4–10.2)
CARDIAC TROPONIN I PNL SERPL HS: <2 NG/L (ref ?–50)
CHLORIDE SERPL-SCNC: 106 MMOL/L (ref 96–108)
CO2 SERPL-SCNC: 26 MMOL/L (ref 21–32)
CREAT SERPL-MCNC: 0.69 MG/DL (ref 0.6–1.3)
EOSINOPHIL # BLD AUTO: 0.25 THOUSAND/ÂΜL (ref 0–0.61)
EOSINOPHIL NFR BLD AUTO: 5 % (ref 0–6)
ERYTHROCYTE [DISTWIDTH] IN BLOOD BY AUTOMATED COUNT: 11.9 % (ref 11.6–15.1)
GFR SERPL CREATININE-BSD FRML MDRD: 91 ML/MIN/1.73SQ M
GLUCOSE SERPL-MCNC: 139 MG/DL (ref 65–140)
HCT VFR BLD AUTO: 42.5 % (ref 34.8–46.1)
HGB BLD-MCNC: 14 G/DL (ref 11.5–15.4)
IMM GRANULOCYTES # BLD AUTO: 0.02 THOUSAND/UL (ref 0–0.2)
IMM GRANULOCYTES NFR BLD AUTO: 0 % (ref 0–2)
LYMPHOCYTES # BLD AUTO: 1.15 THOUSANDS/ÂΜL (ref 0.6–4.47)
LYMPHOCYTES NFR BLD AUTO: 23 % (ref 14–44)
MCH RBC QN AUTO: 29.9 PG (ref 26.8–34.3)
MCHC RBC AUTO-ENTMCNC: 32.9 G/DL (ref 31.4–37.4)
MCV RBC AUTO: 91 FL (ref 82–98)
MONOCYTES # BLD AUTO: 0.38 THOUSAND/ÂΜL (ref 0.17–1.22)
MONOCYTES NFR BLD AUTO: 8 % (ref 4–12)
NEUTROPHILS # BLD AUTO: 3.16 THOUSANDS/ÂΜL (ref 1.85–7.62)
NEUTS SEG NFR BLD AUTO: 63 % (ref 43–75)
NRBC BLD AUTO-RTO: 0 /100 WBCS
PLATELET # BLD AUTO: 309 THOUSANDS/UL (ref 149–390)
PMV BLD AUTO: 10.2 FL (ref 8.9–12.7)
POTASSIUM SERPL-SCNC: 4.6 MMOL/L (ref 3.5–5.3)
PROT SERPL-MCNC: 6.9 G/DL (ref 6.4–8.4)
RBC # BLD AUTO: 4.69 MILLION/UL (ref 3.81–5.12)
SODIUM SERPL-SCNC: 138 MMOL/L (ref 135–147)
WBC # BLD AUTO: 4.99 THOUSAND/UL (ref 4.31–10.16)

## 2025-06-23 PROCEDURE — 74174 CTA ABD&PLVS W/CONTRAST: CPT

## 2025-06-23 PROCEDURE — 99203 OFFICE O/P NEW LOW 30 MIN: CPT

## 2025-06-23 PROCEDURE — 99285 EMERGENCY DEPT VISIT HI MDM: CPT | Performed by: EMERGENCY MEDICINE

## 2025-06-23 PROCEDURE — 85025 COMPLETE CBC W/AUTO DIFF WBC: CPT

## 2025-06-23 PROCEDURE — 93005 ELECTROCARDIOGRAM TRACING: CPT

## 2025-06-23 PROCEDURE — 99285 EMERGENCY DEPT VISIT HI MDM: CPT

## 2025-06-23 PROCEDURE — 71046 X-RAY EXAM CHEST 2 VIEWS: CPT

## 2025-06-23 PROCEDURE — 84484 ASSAY OF TROPONIN QUANT: CPT

## 2025-06-23 PROCEDURE — 71275 CT ANGIOGRAPHY CHEST: CPT

## 2025-06-23 PROCEDURE — 80053 COMPREHEN METABOLIC PANEL: CPT

## 2025-06-23 PROCEDURE — NC001 PR NO CHARGE

## 2025-06-23 PROCEDURE — 36415 COLL VENOUS BLD VENIPUNCTURE: CPT

## 2025-06-23 RX ADMIN — IOHEXOL 50 ML: 300 INJECTION, SOLUTION INTRAVENOUS at 12:45

## 2025-06-23 NOTE — DISCHARGE INSTRUCTIONS
Must follow-up with cardiology and also primary care physician recheck blood pressure and get set up for ultrasound of the thyroid gland nodule to rule out any cancerous changes

## 2025-06-23 NOTE — ED PROVIDER NOTES
Date: 2025  Time: 7:05 PM    Subjective: Discussion regarding CTA chest abdomen pelvis with without contrast    Patient verified name as well as  prior to discussion of results.  Discussed incidental findings, including thyroid nodule for which nonemergent thyroid ultrasound is recommended.  Patient asked to have the results forwarded to their new PCP (Kaylene).  Results have been forwarded however stated that at times forwarding the message may not be successful.  Patient confirmed that she is able to view the results on [employer] and will show the PCP if the message was not forwarded successfully. Strict return precautions were discussed and patient verbalized understanding agreement to the plan.    By: Valentine Gillespie PA-C  [phone]

## 2025-06-23 NOTE — PROGRESS NOTES
"  [facility]        NAME: Julia Engelmann is a 64 y.o. female  : 1960    MRN: [id number]  DATE: 2025  TIME: 11:04 AM    Assessment and Plan   Hypertension, unspecified type [I10]  1. Hypertension, unspecified type  Transfer to other facility    Ambulatory Referral to Family Practice    Ambulatory Referral to Cardiology      2. Acute bilateral thoracic back pain  Transfer to other facility    ECG 12 lead      3. Does not have primary care provider  Ambulatory Referral to Family Practice            Patient Instructions     You are to go to the ED for further evaluation of your symptoms.    Follow-up with your PCP after the ED.     If tests are performed, our office will contact you with results only if changes need to made to the care plan discussed with you at the visit. [facility]      Chief Complaint     Chief Complaint   Patient presents with    Medical Problem     Pt states she has had fluctuating blood pressures that has been going on for over a year. Her physician states she is fine and \"within range\". She has noticed pain across her lower back and into her chest that started 0800 today. She does lift weights         History of Present Illness       64-year-old female who presents today for blood pressure check.   Patient with known hypertension takes metoprolol succinate 50 mg daily.  Reports fluctuating blood pressures over the past one year (115/70 - 152/96).  PCP aware, not concerned.  8 AM today patient noticed pain across her low back which radiated into her chest.  Described as a dull ache.   She does lift dumbbell weights but has not changed routine.  No other recent heavy lifting or known injury.   Also having headaches.  Denies fatigue, diaphoresis, dizziness/lightheadedness, blurred vision, palpitations, SOB, arm pain, nausea/vomiting, and weakness.   Requesting referral for new PCP.        Review of Systems   Review of Systems "   Constitutional:  Negative for diaphoresis and fatigue.   Eyes:  Negative for visual disturbance.   Respiratory:  Negative for shortness of breath.    Cardiovascular:  Negative for chest pain.   Gastrointestinal:  Negative for abdominal pain, nausea and vomiting.   Musculoskeletal:  Positive for back pain (radiating into anterior chest). Negative for neck pain.   Neurological:  Positive for headaches. Negative for dizziness, syncope, weakness, light-headedness and numbness.         Current Medications     Current Medications[1]    Current Allergies     Allergies as of 06/23/2025 - Reviewed 06/23/2025   Allergen Reaction Noted    Acyclovir Headache 03/03/2023    Demerol [meperidine] Vomiting 03/03/2023    Gluten meal - food allergy GI Intolerance 02/08/2024    Percocet [oxycodone-acetaminophen] Vomiting 03/03/2023    Tetracycline Vomiting 03/03/2023    Oxycodone Vomiting 05/18/2015            The following portions of the patient's history were reviewed and updated as appropriate: allergies, current medications, past family history, past medical history, past social history, past surgical history and problem list.     Past Medical History[2]    Past Surgical History[3]    Family History[4]      Medications have been verified.        Objective   /96 (BP Location: Left arm)   Pulse 68   Temp (!) 96.9 °F (36.1 °C)   Resp 18   SpO2 99%        Physical Exam     Physical Exam  Vitals and nursing note reviewed.   Constitutional:       General: She is not in acute distress.     Appearance: She is not ill-appearing or diaphoretic.   HENT:      Head: Normocephalic and atraumatic.      Mouth/Throat:      Mouth: Mucous membranes are moist.     Cardiovascular:      Rate and Rhythm: Normal rate and regular rhythm.      Pulses: Normal pulses.      Heart sounds: Normal heart sounds.   Pulmonary:      Effort: Pulmonary effort is normal.      Breath sounds: Normal breath sounds.     Musculoskeletal:         General: Normal  range of motion.        Arms:       Cervical back: Normal range of motion and neck supple.      Comments: Not reproducible with palpation.     Skin:     General: Skin is warm and dry.      Capillary Refill: Capillary refill takes less than 2 seconds.     Neurological:      Mental Status: She is alert and oriented to person, place, and time.      Gait: Gait normal.           EKG  Sinus bradycardia, rate 60  No ischemic changes           [1]   Current Outpatient Medications:     Biotin 10 MG CAPS, every 24 hours, Disp: , Rfl:     Calcium-Magnesium-Vitamin D (CALCIUM 1200+D3 PO), every 24 hours, Disp: , Rfl:     cetirizine (ZyrTEC) 10 mg tablet, Take 10 mg by mouth daily, Disp: , Rfl:     diclofenac (VOLTAREN) 75 mg EC tablet, 2 (two) times a day, Disp: , Rfl:     Magnesium 300 MG CAPS, every 24 hours, Disp: , Rfl:     metoprolol succinate (TOPROL-XL) 50 mg 24 hr tablet, Take 50 mg by mouth daily, Disp: , Rfl:     Omega-3 Fatty Acids (fish oil) 1,000 mg, 1 capsule every 24 hours, Disp: , Rfl:   [2]   Past Medical History:  Diagnosis Date    Arthritis     Celiac disease     Exercises daily     twice a day    PONV (postoperative nausea and vomiting)     pt responds well to scopolamine patch   [3]   Past Surgical History:  Procedure Laterality Date    COLONOSCOPY      DILATION AND CURETTAGE OF UTERUS      EGD      FL GUIDED NEEDLE PLAC BX/ASP/INJ  04/25/2023    FL GUIDED NEEDLE PLAC BX/ASP/INJ  08/08/2023    KNEE ARTHROPLASTY Left 2014    KNEE SURGERY Right 2015    arthroplasty (2004) revision    [place] ARTHRD MIDTARSL/TARSOMETATARSAL MULT/TRANSVRS Left 2/19/2024    Procedure: Left 2nd and 3rd and naviculocuneiform joint fusions with 4th and 5th TMT joint interpositional arthroplasties;  Surgeon: James R Lachman, MD;  Location:  MAIN OR;  Service: Orthopedics   [4]   Family History  Problem Relation Name Age of Onset    Hypertension Mother      Throat cancer Mother      Diabetes Father      Heart attack Father       Hypertension Father

## 2025-06-23 NOTE — ED PROVIDER NOTES
Time reflects when diagnosis was documented in both MDM as applicable and the Disposition within this note       Time User Action Codes Description Comment    6/23/2025  1:47 PM Tyler Heath Add [R07.89] Atypical chest pain     6/23/2025  1:48 PM Tyler Heath Add [I10] Hypertension     6/23/2025  1:48 PM Tyler Heath Add [E04.1] Thyroid nodule           ED Disposition       ED Disposition   Discharge    Condition   Stable    Date/Time   Mon Jun 23, 2025  1:47 PM    Comment   Joyce Engelmann discharge to home/self care.                   Assessment & Plan       Medical Decision Making  Chest and back pain rule out atypical cardiac disease versus dissection will check EKG labs and CAT scan    Amount and/or Complexity of Data Reviewed  Radiology: ordered and independent interpretation performed.    Risk  Prescription drug management.             Medications   iohexol (OMNIPAQUE) 300 mg/mL injection 50 mL (50 mL Intravenous Given 6/23/25 1245)       ED Risk Strat Scores   HEART Risk Score      Flowsheet Row Most Recent Value   Heart Score Risk Calculator    History 0 Filed at: 06/23/2025 1203   ECG 0 Filed at: 06/23/2025 1203   Age 2 Filed at: 06/23/2025 1203   Risk Factors 1 Filed at: 06/23/2025 1203   Troponin 0 Filed at: 06/23/2025 1203   HEART Score 3 Filed at: 06/23/2025 1203          HEART Risk Score      Flowsheet Row Most Recent Value   Heart Score Risk Calculator    History 0 Filed at: 06/23/2025 1203   ECG 0 Filed at: 06/23/2025 1203   Age 2 Filed at: 06/23/2025 1203   Risk Factors 1 Filed at: 06/23/2025 1203   Troponin 0 Filed at: 06/23/2025 1203   HEART Score 3 Filed at: 06/23/2025 1203                      No data recorded        SBIRT 20yo+      Flowsheet Row Most Recent Value   Initial Alcohol Screen: US AUDIT-C     1. How often do you have a drink containing alcohol? 0 Filed at: 06/23/2025 1153   2. How many drinks containing alcohol do you have on a typical day you are drinking?  0 Filed at:  06/23/2025 1158   3a. Male UNDER 65: How often do you have five or more drinks on one occasion? 0 Filed at: 06/23/2025 1159   3b. FEMALE Any Age, or MALE 65+: How often do you have 4 or more drinks on one occassion? 0 Filed at: 06/23/2025 1158   Audit-C Score 0 Filed at: 06/23/2025 1159   RUDY: How many times in the past year have you...    Used an illegal drug or used a prescription medication for non-medical reasons? Never Filed at: 06/23/2025 1159                            History of Present Illness       Chief Complaint   Patient presents with    Chest Pain     Upper back pain that radiates into chest, started 8 AM today. Headache. Pt Bp has been fluctuating past year. Takes metoprolol 50 mg daily for HTN, took this AM. Denies SOB.        Past Medical History[1]   Past Surgical History[2]   Family History[3]   Social History[4]   E-Cigarette/Vaping    E-Cigarette Use Never User       E-Cigarette/Vaping Substances    Nicotine No     THC No     CBD No     Flavoring No     Other No     Unknown No       I have reviewed and agree with the history as documented.     This is a 64-year-old female who presents for evaluation of headache thoracic back pain chest pain that started at 8:00 this morning while at rest she was seen at urgent care and referred here for further evaluation also ambulatory referral to family practice and cardiology was placed.  She does have a history of hypertension.  States that she does a lot of exercise and weight lifting thought she may have injured something in her back.  Denies any shortness of breath has some mild nausea      History provided by:  Patient  Medical Problem  Location:  Mid thoracic  Quality:  Aching pain  Severity:  Moderate  Onset quality:  Sudden  Duration:  4 hours  Timing:  Constant  Progression:  Waxing and waning  Chronicity:  New  Context:  Back and chest pain that started this morning  Worsened by:  Movement  Associated symptoms: chest pain, headaches and nausea     Associated symptoms: no abdominal pain and no shortness of breath        Review of Systems   Respiratory:  Negative for shortness of breath.    Cardiovascular:  Positive for chest pain.   Gastrointestinal:  Positive for nausea. Negative for abdominal pain.   Musculoskeletal:  Positive for back pain.   Neurological:  Positive for headaches.   All other systems reviewed and are negative.          Objective       ED Triage Vitals   Temperature Pulse Blood Pressure Respirations SpO2 Patient Position - Orthostatic VS   06/23/25 1117 06/23/25 1117 06/23/25 1117 06/23/25 1117 06/23/25 1117 06/23/25 1215   97.5 °F (36.4 °C) 70 161/83 17 100 % Sitting      Temp Source Heart Rate Source BP Location FiO2 (%) Pain Score    06/23/25 1117 06/23/25 1117 06/23/25 1117 -- --    Oral Monitor Left arm        Vitals      Date and Time Temp Pulse SpO2 Resp BP Pain Score FACES Pain Rating User   06/23/25 1300 -- 56 98 % 18 155/75 -- -- ML   06/23/25 1230 -- 55 98 % 18 134/81 -- -- ML   06/23/25 1215 -- 60 99 % 18 145/91 -- -- ML   06/23/25 1117 97.5 °F (36.4 °C) 70 100 % 17 161/83 -- -- LK            Physical Exam  Vitals and nursing note reviewed.   Constitutional:       General: She is not in acute distress.     Appearance: She is not ill-appearing, toxic-appearing or diaphoretic.   HENT:      Head: Normocephalic and atraumatic.      Right Ear: External ear normal.      Left Ear: External ear normal.     Eyes:      General:         Right eye: No discharge.         Left eye: No discharge.      Extraocular Movements: Extraocular movements intact.      Pupils: Pupils are equal, round, and reactive to light.       Cardiovascular:      Rate and Rhythm: Normal rate and regular rhythm.      Heart sounds: No murmur heard.     No friction rub. No gallop.   Pulmonary:      Effort: No respiratory distress.      Breath sounds: No stridor. No wheezing, rhonchi or rales.   Abdominal:      General: There is no distension.      Palpations: Abdomen  is soft.      Tenderness: There is no abdominal tenderness. There is no guarding.     Musculoskeletal:         General: No swelling, tenderness, deformity or signs of injury.      Cervical back: Normal range of motion and neck supple. No rigidity.      Right lower leg: No edema.      Left lower leg: No edema.     Skin:     General: Skin is warm and dry.      Coloration: Skin is not jaundiced.      Findings: No bruising, erythema or rash.     Neurological:      General: No focal deficit present.      Mental Status: She is alert and oriented to person, place, and time.      Cranial Nerves: No cranial nerve deficit.      Sensory: No sensory deficit.      Motor: No weakness.      Coordination: Coordination normal.      Gait: Gait normal.     Psychiatric:         Mood and Affect: Mood normal.         Thought Content: Thought content normal.         Results Reviewed       Procedure Component Value Units Date/Time    HS Troponin 0hr (reflex protocol) [[id number]]  (Normal) Collected: 06/23/25 1122    Lab Status: Final result Specimen: Blood from Arm, Right Updated: 06/23/25 1150     hs TnI 0hr <2 ng/L     Comprehensive metabolic panel [[id number]]  (Abnormal) Collected: 06/23/25 1122    Lab Status: Final result Specimen: Blood from Arm, Right Updated: 06/23/25 1142     Sodium 138 mmol/L      Potassium 4.6 mmol/L      Chloride 106 mmol/L      CO2 26 mmol/L      ANION GAP 6 mmol/L      BUN 16 mg/dL      Creatinine 0.69 mg/dL      Glucose 139 mg/dL      Calcium 9.5 mg/dL      AST 17 U/L      ALT 13 U/L      Alkaline Phosphatase 109 U/L      Total Protein 6.9 g/dL      Albumin 4.4 g/dL      Total Bilirubin 0.49 mg/dL      eGFR 91 ml/min/1.73sq m     Narrative:      National Kidney Disease Foundation guidelines for Chronic Kidney Disease (CKD):     Stage 1 with normal or high GFR (GFR > 90 mL/min/1.73 square meters)    Stage 2 Mild CKD (GFR = 60-89 mL/min/1.73 square meters)    Stage 3A Moderate CKD (GFR = 45-59 mL/min/1.73  square meters)    Stage 3B Moderate CKD (GFR = 30-44 mL/min/1.73 square meters)    Stage 4 Severe CKD (GFR = 15-29 mL/min/1.73 square meters)    Stage 5 End Stage CKD (GFR <15 mL/min/1.73 square meters)  Note: GFR calculation is accurate only with a steady state creatinine    CBC and differential [[id number]] Collected: 06/23/25 1122    Lab Status: Final result Specimen: Blood from Arm, Right Updated: 06/23/25 1128     WBC 4.99 Thousand/uL      RBC 4.69 Million/uL      Hemoglobin 14.0 g/dL      Hematocrit 42.5 %      MCV 91 fL      MCH 29.9 pg      MCHC 32.9 g/dL      RDW 11.9 %      MPV 10.2 fL      Platelets 309 Thousands/uL      nRBC 0 /100 WBCs      Segmented % 63 %      Immature Grans % 0 %      Lymphocytes % 23 %      Monocytes % 8 %      Eosinophils Relative 5 %      Basophils Relative 1 %      Absolute Neutrophils 3.16 Thousands/µL      Absolute Immature Grans 0.02 Thousand/uL      Absolute Lymphocytes 1.15 Thousands/µL      Absolute Monocytes 0.38 Thousand/µL      Eosinophils Absolute 0.25 Thousand/µL      Basophils Absolute 0.03 Thousands/µL             CTA dissection protocol chest abdomen pelvis w wo contrast   Final Interpretation by Kiran Obrien MD (06/23 8098)   Addendum (preliminary) 1 of 1 by Kiran Obiren MD (06/23 1548)   ADDENDUM:   This addendum is for the title and technique sections of the report         CTA - CHEST, ABDOMEN AND PELVIS - WITH IV CONTRAST         TECHNIQUE: Virtual noncontrast CT was performed using dual energy    technique. Thin section angiographic arterial phase post contrast    technique was used in order to evaluate for aortic dissection. 3D    reformatted images and volume rendering were performed    on an independent workstation. Additionally, axial, sagittal, and coronal    2D reformatted images were created from the source data and submitted for    interpretation.      Final      No evidence of aortic dissection or intramural hematoma.      Incidental  thyroid nodule (s) for which nonemergent thyroid ultrasound is recommended.         The study was marked in EPIC for immediate notification.   Computerized Assisted Algorithm (CAA) may have aided analysis of applicable images.         Workstation performed: [id number]         XR chest 2 views   ED Interpretation by Tyler Heath DO ( 1155)   No acute infiltrate or pneumothorax no CHF questionable tracheal deviation          ECG 12 Lead Documentation Only    Date/Time: 2025 11:52 AM    Performed by: Tyler Heath DO  Authorized by: Tyler Heath DO    ECG reviewed by me, the ED Provider: yes    Patient location:  ED  Rate:     ECG rate:  68  Rhythm:     Rhythm: sinus rhythm    Conduction:     Conduction: normal    T waves:     T waves: normal        ED Medication and Procedure Management   Prior to Admission Medications   Prescriptions Last Dose Informant Patient Reported? Taking?   Biotin 10 MG CAPS   Yes No   Sig: every 24 hours   Calcium-Magnesium-Vitamin D (CALCIUM 1200+D3 PO)   Yes No   Sig: every 24 hours   Magnesium 300 MG CAPS   Yes No   Sig: every 24 hours   Omega-3 Fatty Acids (fish oil) 1,000 mg   Yes No   Si capsule every 24 hours   cetirizine (ZyrTEC) 10 mg tablet   Yes No   Sig: Take 10 mg by mouth daily   diclofenac (VOLTAREN) 75 mg EC tablet  Self Yes No   Si (two) times a day   metoprolol succinate (TOPROL-XL) 50 mg 24 hr tablet  Self Yes No   Sig: Take 50 mg by mouth daily      Facility-Administered Medications: None     Discharge Medication List as of 2025  1:54 PM        CONTINUE these medications which have NOT CHANGED    Details   Biotin 10 MG CAPS every 24 hours, Historical Med      Calcium-Magnesium-Vitamin D (CALCIUM 1200+D3 PO) every 24 hours, Historical Med      cetirizine (ZyrTEC) 10 mg tablet Take 10 mg by mouth daily, Historical Med      diclofenac (VOLTAREN) 75 mg EC tablet 2 (two) times a day, Starting 2023, Historical Med      Magnesium 300 MG  CAPS every 24 hours, Historical Med      metoprolol succinate (TOPROL-XL) 50 mg 24 hr tablet Take 50 mg by mouth daily, Historical Med      Omega-3 Fatty Acids (fish oil) 1,000 mg 1 capsule every 24 hours, Historical Med           No discharge procedures on file.  ED SEPSIS DOCUMENTATION   Time reflects when diagnosis was documented in both MDM as applicable and the Disposition within this note       Time User Action Codes Description Comment    6/23/2025  1:47 PM Tyler Heath [R07.89] Atypical chest pain     6/23/2025  1:48 PM Tyler Heath [I10] Hypertension     6/23/2025  1:48 PM Tyler Heath [E04.1] Thyroid nodule                      [1]   Past Medical History:  Diagnosis Date    Arthritis     Celiac disease     Exercises daily     twice a day    PONV (postoperative nausea and vomiting)     pt responds well to scopolamine patch   [2]   Past Surgical History:  Procedure Laterality Date    COLONOSCOPY      DILATION AND CURETTAGE OF UTERUS      EGD      FL GUIDED NEEDLE PLAC BX/ASP/INJ  04/25/2023    FL GUIDED NEEDLE PLAC BX/ASP/INJ  08/08/2023    KNEE ARTHROPLASTY Left 2014    KNEE SURGERY Right 2015    arthroplasty (2004) revision    [place] ARTHRD MIDTARSL/TARSOMETATARSAL MULT/TRANSVRS Left 2/19/2024    Procedure: Left 2nd and 3rd and naviculocuneiform joint fusions with 4th and 5th TMT joint interpositional arthroplasties;  Surgeon: James R Lachman, MD;  Location:  MAIN OR;  Service: Orthopedics   [3]   Family History  Problem Relation Name Age of Onset    Hypertension Mother      Throat cancer Mother      Diabetes Father      Heart attack Father      Hypertension Father     [4]   Social History  Tobacco Use    Smoking status: Never    Smokeless tobacco: Never   Vaping Use    Vaping status: Never Used   Substance Use Topics    Alcohol use: Yes     Comment: rare    Drug use: Never        Tyler Heath DO  06/23/25 [id number]

## 2025-06-24 LAB
ATRIAL RATE: 68 BPM
P AXIS: 8 DEGREES
PR INTERVAL: 180 MS
QRS AXIS: 29 DEGREES
QRSD INTERVAL: 82 MS
QT INTERVAL: 404 MS
QTC INTERVAL: 430 MS
T WAVE AXIS: 1 DEGREES
VENTRICULAR RATE: 68 BPM

## 2025-06-24 PROCEDURE — 93010 ELECTROCARDIOGRAM REPORT: CPT | Performed by: INTERNAL MEDICINE

## 2025-06-25 LAB
ATRIAL RATE: 56 BPM
P AXIS: 26 DEGREES
PR INTERVAL: 166 MS
QRS AXIS: 14 DEGREES
QRSD INTERVAL: 82 MS
QT INTERVAL: 438 MS
QTC INTERVAL: 423 MS
T WAVE AXIS: 13 DEGREES
VENTRICULAR RATE: 56 BPM

## 2025-06-25 PROCEDURE — 93010 ELECTROCARDIOGRAM REPORT: CPT | Performed by: INTERNAL MEDICINE

## 2025-07-02 ENCOUNTER — OFFICE VISIT (OUTPATIENT)
Dept: FAMILY MEDICINE CLINIC | Facility: HOSPITAL | Age: 65
End: 2025-07-02
Payer: COMMERCIAL

## 2025-07-02 VITALS
OXYGEN SATURATION: 97 % | SYSTOLIC BLOOD PRESSURE: 140 MMHG | DIASTOLIC BLOOD PRESSURE: 70 MMHG | WEIGHT: 126 LBS | HEART RATE: 72 BPM | HEIGHT: 59 IN | BODY MASS INDEX: 25.4 KG/M2 | RESPIRATION RATE: 16 BRPM

## 2025-07-02 DIAGNOSIS — Z75.8 DOES NOT HAVE PRIMARY CARE PROVIDER: ICD-10-CM

## 2025-07-02 DIAGNOSIS — I10 HYPERTENSION, UNSPECIFIED TYPE: ICD-10-CM

## 2025-07-02 DIAGNOSIS — E66.3 OVERWEIGHT (BMI 25.0-29.9): ICD-10-CM

## 2025-07-02 DIAGNOSIS — Z11.59 NEED FOR HEPATITIS C SCREENING TEST: ICD-10-CM

## 2025-07-02 DIAGNOSIS — E04.1 THYROID NODULE: Primary | ICD-10-CM

## 2025-07-02 DIAGNOSIS — Z13.1 ENCOUNTER FOR SCREENING FOR DIABETES MELLITUS: ICD-10-CM

## 2025-07-02 DIAGNOSIS — M54.6 ACUTE MIDLINE THORACIC BACK PAIN: ICD-10-CM

## 2025-07-02 PROCEDURE — 99204 OFFICE O/P NEW MOD 45 MIN: CPT

## 2025-07-02 RX ORDER — COLLAGEN, HYDROLYSATE (BOVINE) 100 %
POWDER (GRAM) MISCELLANEOUS
COMMUNITY

## 2025-07-02 NOTE — PROGRESS NOTES
Name: Julia Engelmann      : 1960      MRN: [id number]  Encounter Provider: Kiran Gallagher MD  Encounter Date: 2025   Encounter department: [facility] [address]  :  Assessment & Plan  Hypertension, unspecified type  Controlled, reports normal range blood pressures at home, continue metoprolol 50 mg daily  Orders:    Ambulatory Referral to Family Practice    Does not have primary care provider    Orders:    Ambulatory Referral to Family Practice    Thyroid nodule  Detail nodule noticed on CT in ED, ultrasound recommended and ordered as well as thyroid testing  Orders:    US thyroid; Future    Overweight (BMI 25.0-29.9)    Orders:    Lipid Panel with Direct LDL reflex; Future    Hemoglobin A1C; Future    Encounter for screening for diabetes mellitus    Orders:    Hemoglobin A1C; Future    Need for hepatitis C screening test    Orders:    Hepatitis C Antibody; Future    Acute midline thoracic back pain  Isolated episode of upper back pain between shoulder blades radiating to her chest resolved by the time she went to the emergency room, workup there negative with exception of incidental thyroid nodule.  Denies any reoccurrence or association with palpitations, dizziness or shortness of breath.  No further evaluation necessary at this time              History of Present Illness   HPI  Home BP in the 1202-130s/70s-80s.  Presents to establish care.  Recent brief isolated episode of upper back pain between her shoulder blades radiating to her chest, not associated with palpitations, shortness of breath or dizziness.  ED workup negative with the exception of a incidental thyroid nodule for which follow-up ultrasound was recommended.    Review of Systems   Constitutional:  Negative for chills and fever.   Respiratory:  Negative for shortness of breath.    Cardiovascular:  Negative for chest pain, palpitations and leg swelling.   Gastrointestinal:  Negative for diarrhea, nausea and  "vomiting.   Musculoskeletal:  Negative for back pain.   Neurological:  Negative for dizziness and headaches.       Objective   /70   Pulse 72   Resp 16   Ht 4' 11\" (1.499 m)   Wt 57.2 kg (126 lb)   SpO2 97%   BMI 25.45 kg/m²      Physical Exam  Constitutional:       General: She is not in acute distress.     Appearance: Normal appearance. She is not ill-appearing, toxic-appearing or diaphoretic.   HENT:      Head: Normocephalic.     Eyes:      Conjunctiva/sclera: Conjunctivae normal.       Cardiovascular:      Rate and Rhythm: Normal rate and regular rhythm.      Heart sounds: Normal heart sounds. No murmur heard.  Pulmonary:      Effort: Pulmonary effort is normal. No respiratory distress.      Breath sounds: Normal breath sounds.     Musculoskeletal:      Comments: No thoracic spinal or paraspinal tenderness     Neurological:      Mental Status: She is alert and oriented to person, place, and time.     Psychiatric:         Mood and Affect: Mood normal.         Behavior: Behavior normal.         "

## 2025-07-02 NOTE — ASSESSMENT & PLAN NOTE
Controlled, reports normal range blood pressures at home, continue metoprolol 50 mg daily  Orders:    Ambulatory Referral to Family Practice

## 2025-07-14 ENCOUNTER — APPOINTMENT (OUTPATIENT)
Dept: LAB | Facility: HOSPITAL | Age: 65
End: 2025-07-14
Payer: COMMERCIAL

## 2025-07-14 ENCOUNTER — HOSPITAL ENCOUNTER (OUTPATIENT)
Dept: ULTRASOUND IMAGING | Facility: HOSPITAL | Age: 65
Discharge: HOME | End: 2025-07-14
Payer: COMMERCIAL

## 2025-07-14 DIAGNOSIS — E04.1 THYROID NODULE: ICD-10-CM

## 2025-07-14 DIAGNOSIS — Z13.1 ENCOUNTER FOR SCREENING FOR DIABETES MELLITUS: ICD-10-CM

## 2025-07-14 DIAGNOSIS — E66.3 OVERWEIGHT (BMI 25.0-29.9): ICD-10-CM

## 2025-07-14 DIAGNOSIS — Z11.59 NEED FOR HEPATITIS C SCREENING TEST: ICD-10-CM

## 2025-07-14 LAB
CHOLEST SERPL-MCNC: 200 MG/DL (ref ?–200)
EST. AVERAGE GLUCOSE BLD GHB EST-MCNC: 108 MG/DL
HBA1C MFR BLD: 5.4 %
HCV AB SER QL: NORMAL
HDLC SERPL-MCNC: 53 MG/DL
LDLC SERPL CALC-MCNC: 129 MG/DL (ref 0–100)
T4 FREE SERPL-MCNC: 2.07 NG/DL (ref 0.61–1.12)
TRIGL SERPL-MCNC: 89 MG/DL (ref ?–150)
TSH SERPL DL<=0.05 MIU/L-ACNC: 0.91 UIU/ML (ref 0.45–4.5)

## 2025-07-14 PROCEDURE — 84439 ASSAY OF FREE THYROXINE: CPT

## 2025-07-14 PROCEDURE — 84443 ASSAY THYROID STIM HORMONE: CPT

## 2025-07-14 PROCEDURE — 76536 US EXAM OF HEAD AND NECK: CPT

## 2025-07-14 PROCEDURE — 36415 COLL VENOUS BLD VENIPUNCTURE: CPT

## 2025-07-14 PROCEDURE — 86803 HEPATITIS C AB TEST: CPT

## 2025-07-14 PROCEDURE — 83036 HEMOGLOBIN GLYCOSYLATED A1C: CPT

## 2025-07-14 PROCEDURE — 80061 LIPID PANEL: CPT

## 2025-07-16 DIAGNOSIS — E04.1 THYROID NODULE: Primary | ICD-10-CM

## 2025-07-16 DIAGNOSIS — R79.89 ELEVATED SERUM FREE T4 LEVEL: ICD-10-CM

## 2025-07-21 ENCOUNTER — OFFICE VISIT (OUTPATIENT)
Dept: ENDOCRINOLOGY | Facility: HOSPITAL | Age: 65
End: 2025-07-21
Payer: COMMERCIAL

## 2025-07-21 VITALS
HEIGHT: 59 IN | HEART RATE: 60 BPM | BODY MASS INDEX: 26.04 KG/M2 | DIASTOLIC BLOOD PRESSURE: 84 MMHG | SYSTOLIC BLOOD PRESSURE: 144 MMHG | WEIGHT: 129.2 LBS

## 2025-07-21 DIAGNOSIS — E04.1 THYROID NODULE: ICD-10-CM

## 2025-07-21 DIAGNOSIS — R79.89 ELEVATED SERUM FREE T4 LEVEL: ICD-10-CM

## 2025-07-21 DIAGNOSIS — E04.1 LEFT THYROID NODULE: Primary | ICD-10-CM

## 2025-07-21 DIAGNOSIS — R94.6 THYROID FUNCTION TEST ABNORMAL: ICD-10-CM

## 2025-07-21 PROCEDURE — 99204 OFFICE O/P NEW MOD 45 MIN: CPT | Performed by: INTERNAL MEDICINE

## 2025-07-21 NOTE — PATIENT INSTRUCTIONS
The thyroid nodule is about 2.7 cm, likely needs biopsy but need to await the final thyroid ultrasound results.     It is a fine needle aspiration biopsy of thyroid nodule under ultrasound guidance.     We'll recheck the thyroid blood work but go off the biotin for about 3 days before the blood work.     Follow up to be determined.

## 2025-07-21 NOTE — PROGRESS NOTES
Name: Julia Engelmann      : 1960      MRN: [id number]  Encounter Provider: Corin Washington MD  Encounter Date: 2025   Encounter department: [facility]    No chief complaint on file.  :  Assessment & Plan  Thyroid nodule    Orders:    Ambulatory Referral to Endocrinology    Elevated serum free T4 level    Orders:    Ambulatory Referral to Endocrinology    T4, free; Future    TSH, 3rd generation; Future    Thyroid Antibodies Panel; Future    Left thyroid nodule    Orders:    T4, free; Future    TSH, 3rd generation; Future    Thyroid Antibodies Panel; Future    Thyroid function test abnormal    Orders:    T4, free; Future    TSH, 3rd generation; Future    Thyroid Antibodies Panel; Future      Assessment & Plan  1. Thyroid nodule.  - A 2.7 cm thyroid nodule was identified on the left side.  - The final results of the recent ultrasound are pending. The TSH level is within the normal range, but the free T4 level is slightly elevated, which could be influenced by biotin intake.  - A fine needle aspiration biopsy may be necessary for further evaluation, but this will be determined after the final ultrasound results are available.  - She has been advised to discontinue biotin for 3 days before repeating the blood work to ensure accurate readings.    2.  Abnormal thyroid function tests, elevated free T4.  - The TSH level is within the normal range, but the free T4 level is slightly elevated, which could be influenced by biotin intake.  - She has been advised to discontinue biotin for 3 days before repeating the blood work to ensure accurate readings.    3. Blood pressure management: Unchanged.  - Her blood pressure readings have been fluctuating, with home readings ranging from 130/70 to 126/70.  - Physical exam findings include high normal blood pressure.  - She has expressed concerns about the fluctuations and has requested a review of her medication.  - She is  currently on metoprolol XL once a day.    We await the most recent thyroid ultrasound report.  This will determine whether a biopsy is needed which I suspect based on the size but I would like the T rad classification.    She will repeat a TSH with free T4 and thyroid antibodies at her earliest convenience.  She will make sure to not take biotin for at least 3 days prior to the blood work.    Follow-up will be determined based on the studies.      Pertinent Medical History   Julia Engelmann is a 64 y.o. female with thyroid nodule and elevated free T4.    She had some back pain between her shoulder blades and was seen in the emergency room in June 2025.  Chest x-ray showed a deviated trachea and then a CT scan showed a 2.7 x 2.7 cm left thyroid nodule.  She had a thyroid ultrasound performed on 7/14/2025 which is still pending.  Blood work was performed demonstrating an elevated free T4 with a normal TSH.  She does though take biotin.          History of Present Illness   History of Present Illness  Julia Engelmann is a 64-year-old female here for evaluation/consultation regarding her thyroid.    She reports sudden onset of soreness between her shoulder blades while babysitting her granddaughter. She sought immediate medical attention at an urgent care facility, where an EKG was performed and found to be normal. A subsequent chest x-ray revealed a deviated trachea, prompting further investigation with a CT scan. The scan identified a nodule in her thyroid, but all cardiac parameters were normal. An ultrasound was recommended, which she underwent last week. She has no family history of thyroid issues. She continues to take biotin.     She reports no hot flashes, excessive sweating, cold intolerance, heart palpitations, tremors, dry skin, brittle nails, hair loss, diarrhea, constipation, anxiety, depression, or difficulty swallowing. She has never received radiation treatment to her head or neck and was previously  "unaware of any thyroid issues.    She has been experiencing unexplained weight gain and increased fatigue, despite maintaining a regular exercise regimen of twice-daily gym sessions and swimming. Her weight fluctuates between 129 and 126 pounds, occasionally dropping to 125 pounds before increasing again. She maintains a healthy diet and is frustrated by her inability to lose weight despite her efforts. She was previously taking a natural metabolism booster, which she plans to resume as it made her feel more energetic and stronger during workouts.    She has a history of high blood pressure, with readings ranging from 144/93 to 116/70. She is currently on metoprolol XL once daily and also takes beetroot juice chews twice daily.    FAMILY HISTORY  Her mother had throat cancer and minor blood pressure issues. Her father had diabetes and congestive heart failure. Her daughter has celiac disease and an autoimmune disease.    Review of Systems as per \[facility] [place] [facility]\""  Medical History Reviewed by provider this encounter:  Tobacco  Allergies  Meds  Problems  Med Hx  Surg Hx  Fam Hx     .  Medications Ordered Prior to Encounter[1]   Social History[2]     Medical History Reviewed by provider this encounter:  Tobacco  Allergies  Meds  Problems  Med Hx  Surg Hx  Fam Hx     .    Objective   /84   Pulse 60   Ht 4' 11\" (1.499 m)   Wt 58.6 kg (129 lb 3.2 oz)   BMI 26.10 kg/m²      Body mass index is 26.1 kg/m².  Wt Readings from Last 3 Encounters:   07/21/25 58.6 kg (129 lb 3.2 oz)   07/02/25 57.2 kg (126 lb)   08/12/24 57.2 kg (126 lb)     Physical Exam  Physical Exam  Vital Signs: Blood pressure is high normal.  Head and Neck: No lid lag, stare, proptosis, or periorbital edema. Thyroid lobe in the neck has an irregular feel with about a centimeter or so thyroid nodule palpable in the left lobe of the thyroid. No lymphadenopathy of the neck.  Cardiovascular: Heart has a regular rate and rhythm. No " murmurs.  Respiratory: Lungs are clear to auscultation.  Neurological: No tremor of the outstretched hands. Patellar deep tendon reflexes are normal.  Extremities: Trace bilateral lower extremity edema.    Results    Labs: I have reviewed pertinent labs including:   Lab Results   Component Value Date    [id number] 0.906 07/14/2025      Lab Results   Component Value Date    FREET4 2.07 (H) 07/14/2025      Radiology Results Review: I have reviewed radiology reports from [facility] including: CT chest, abdomen, pelvis.    CTA - CHEST, ABDOMEN AND PELVIS - WITHOUT AND  WITH IV CONTRAST performed on 6/23/2025     INDICATION: Thoracic back pain.     COMPARISON: None.     TECHNIQUE: CT examination of the chest, abdomen and pelvis was performed both prior to and after the administration of intravenous contrast. The noncontrast portion of this examination was performed utilizing low radiation dose technique.  Thin section   angiographic arterial phase post contrast technique was used in order to evaluate for aortic dissection. 3D reformatted images and volume rendering were performed on an independent workstation. Additionally, axial, sagittal, and coronal 2D reformatted   images were created from the source data and submitted for interpretation.     Radiation dose length product (DLP) for this visit: 367.6 mGy-cm. . This examination, like all CT scans performed in the [facility], was performed utilizing techniques to minimize radiation dose exposure, including the use of iterative   reconstruction and automated exposure control.     IV Contrast: 50 mL of iohexol (OMNIPAQUE)  Enteric Contrast: Not administered.     FINDINGS:     AORTA: No aortic dissection or intramural hematoma. No aortic aneurysm.     No significant atherosclerotic disease. Specifically, no flow limiting atherosclerotic stenosis of aorta or major aortic branch vessel in the chest, abdomen or pelvis.        CHEST     LUNGS: Lungs are  clear. No tracheal or endobronchial lesion.     PLEURA: Unremarkable.     HEART/PULMONARY ARTERIAL TREE: Unremarkable for patient's age.     MEDIASTINUM AND MELIZA: Unremarkable.     CHEST WALL AND LOWER NECK: There is a 2.7 x 2.7 cm left thyroid nodule. Incidental discovery of one or more thyroid nodule(s) measuring more than 1.5 cm and without suspicious features is noted in this patient who is above 35 years old; according to   guidelines published in the February 2015 white paper on incidental thyroid nodules in the Journal of the American College of Radiology (JACR), further characterization with thyroid ultrasound is recommended.     ABDOMEN     LIVER/BILIARY TREE: Unremarkable.     GALLBLADDER: No calcified gallstones. No pericholecystic inflammatory change.     SPLEEN: Unremarkable.     PANCREAS: Unremarkable.     ADRENAL GLANDS: Unremarkable.     KIDNEYS/URETERS: Unremarkable. No hydronephrosis.     STOMACH AND BOWEL: Colonic diverticulosis without findings of acute diverticulitis.     APPENDIX: No findings to suggest appendicitis.     ABDOMINOPELVIC CAVITY: No ascites. No pneumoperitoneum. No lymphadenopathy.     PELVIS     REPRODUCTIVE ORGANS: Unremarkable for patient's age.     URINARY BLADDER: Unremarkable.     ABDOMINAL WALL/INGUINAL REGIONS: Unremarkable.     BONES: No acute fracture or suspicious osseous lesion. There is a well-corticated defect in the inferior sternal manubrium which may reflect congenital variant or sequela of prior injury.     IMPRESSION:     No evidence of aortic dissection or intramural hematoma.     Incidental thyroid nodule (s) for which nonemergent thyroid ultrasound is recommended.    Patient Instructions   The thyroid nodule is about 2.7 cm, likely needs biopsy but need to await the final thyroid ultrasound results.     It is a fine needle aspiration biopsy of thyroid nodule under ultrasound guidance.     We'll recheck the thyroid blood work but go off the biotin for about 3  days before the blood work.     Follow up to be determined.     Discussed with the patient and all questioned fully answered. She will call me if any problems arise.           [1]   Current Outpatient Medications on File Prior to Visit   Medication Sig Dispense Refill    Biotin 10 MG CAPS every 24 hours      Calcium-Magnesium-Vitamin D (CALCIUM 1200+D3 PO) every 24 hours      cetirizine (ZyrTEC) 10 mg tablet Take 10 mg by mouth in the morning.      Collagen Hydrolysate POWD 1 scoop daily      diclofenac (VOLTAREN) 75 mg EC tablet in the morning and in the evening.      Magnesium 300 MG CAPS every 24 hours      metoprolol succinate (TOPROL-XL) 50 mg 24 hr tablet Take 50 mg by mouth in the morning.      Misc Natural Products (BEET ROOT PO) 2 chews daily      Omega-3 Fatty Acids (fish oil) 1,000 mg 1 capsule every 24 hours      Citrus Bergamot 650 MG TABS 2 daily (Patient not taking: Reported on 7/21/2025)      JUAN MIGUEL PO 2 tabs twice daily (Patient not taking: Reported on 7/21/2025)       No current facility-administered medications on file prior to visit.   [2]   Social History  Tobacco Use    Smoking status: Never    Smokeless tobacco: Never   Vaping Use    Vaping status: Never Used   Substance and Sexual Activity    Alcohol use: Yes     Comment: rare    Drug use: Never

## 2025-07-21 NOTE — ASSESSMENT & PLAN NOTE
Orders:    T4, free; Future    TSH, 3rd generation; Future    Thyroid Antibodies Panel; Future

## 2025-07-22 ENCOUNTER — TELEPHONE (OUTPATIENT)
Age: 65
End: 2025-07-22

## 2025-07-23 DIAGNOSIS — E04.1 LEFT THYROID NODULE: Primary | ICD-10-CM

## 2025-07-23 NOTE — TELEPHONE ENCOUNTER
Call patient.  The final thyroid ultrasound results came in and she has a 3.2 cm thyroid nodule in the left lower pole.  Based on its size and characteristics, as suspected we are going to need to biopsy that nodule and I have already ordered her thyroid nodule biopsy to be done under ultrasound guidance.

## 2025-07-24 NOTE — TELEPHONE ENCOUNTER
The patient was called and made aware of her ultrasound results and the provider's recommendation for a biopsy.  She stated that she will call and schedule appointment later today.

## 2025-07-25 ENCOUNTER — APPOINTMENT (OUTPATIENT)
Dept: LAB | Facility: HOSPITAL | Age: 65
End: 2025-07-25
Payer: COMMERCIAL

## 2025-07-25 DIAGNOSIS — R94.6 THYROID FUNCTION TEST ABNORMAL: ICD-10-CM

## 2025-07-25 DIAGNOSIS — R79.89 ELEVATED SERUM FREE T4 LEVEL: ICD-10-CM

## 2025-07-25 DIAGNOSIS — E04.1 LEFT THYROID NODULE: ICD-10-CM

## 2025-07-25 LAB
T4 FREE SERPL-MCNC: 1.23 NG/DL (ref 0.61–1.12)
TSH SERPL DL<=0.05 MIU/L-ACNC: 0.77 UIU/ML (ref 0.45–4.5)

## 2025-07-25 PROCEDURE — 86376 MICROSOMAL ANTIBODY EACH: CPT

## 2025-07-25 PROCEDURE — 84439 ASSAY OF FREE THYROXINE: CPT

## 2025-07-25 PROCEDURE — 84443 ASSAY THYROID STIM HORMONE: CPT

## 2025-07-25 PROCEDURE — 36415 COLL VENOUS BLD VENIPUNCTURE: CPT

## 2025-07-25 PROCEDURE — 86800 THYROGLOBULIN ANTIBODY: CPT

## 2025-07-26 LAB
THYROGLOB AB SERPL-ACNC: <1 IU/ML (ref 0–0.9)
THYROPEROXIDASE AB SERPL-ACNC: 16 IU/ML (ref 0–34)

## 2025-08-01 ENCOUNTER — OFFICE VISIT (OUTPATIENT)
Dept: OBGYN CLINIC | Facility: CLINIC | Age: 65
End: 2025-08-01
Payer: COMMERCIAL

## 2025-08-01 ENCOUNTER — APPOINTMENT (OUTPATIENT)
Dept: RADIOLOGY | Facility: CLINIC | Age: 65
End: 2025-08-01
Attending: ORTHOPAEDIC SURGERY
Payer: COMMERCIAL

## 2025-08-01 VITALS — HEIGHT: 59 IN | WEIGHT: 125 LBS | BODY MASS INDEX: 25.2 KG/M2

## 2025-08-01 DIAGNOSIS — Z01.89 ENCOUNTER FOR LOWER EXTREMITY COMPARISON IMAGING STUDY: ICD-10-CM

## 2025-08-01 DIAGNOSIS — M25.572 PAIN, JOINT, ANKLE AND FOOT, LEFT: ICD-10-CM

## 2025-08-01 DIAGNOSIS — M76.72 PERONEAL TENDONITIS OF LEFT LOWER LEG: Primary | ICD-10-CM

## 2025-08-01 DIAGNOSIS — M76.62 ACHILLES TENDINITIS OF LEFT LOWER EXTREMITY: ICD-10-CM

## 2025-08-01 PROCEDURE — 99213 OFFICE O/P EST LOW 20 MIN: CPT | Performed by: ORTHOPAEDIC SURGERY

## 2025-08-01 PROCEDURE — 73600 X-RAY EXAM OF ANKLE: CPT

## 2025-08-01 PROCEDURE — 73610 X-RAY EXAM OF ANKLE: CPT

## 2025-08-06 ENCOUNTER — EVALUATION (OUTPATIENT)
Dept: PHYSICAL THERAPY | Facility: CLINIC | Age: 65
End: 2025-08-06
Attending: ORTHOPAEDIC SURGERY
Payer: COMMERCIAL

## 2025-08-06 VITALS — SYSTOLIC BLOOD PRESSURE: 138 MMHG | DIASTOLIC BLOOD PRESSURE: 90 MMHG

## 2025-08-06 DIAGNOSIS — M76.62 ACHILLES TENDINITIS OF LEFT LOWER EXTREMITY: ICD-10-CM

## 2025-08-06 DIAGNOSIS — M76.72 PERONEAL TENDONITIS OF LEFT LOWER LEG: ICD-10-CM

## 2025-08-06 PROCEDURE — 97110 THERAPEUTIC EXERCISES: CPT

## 2025-08-06 PROCEDURE — 97161 PT EVAL LOW COMPLEX 20 MIN: CPT

## 2025-08-11 ENCOUNTER — HOSPITAL ENCOUNTER (OUTPATIENT)
Dept: RADIOLOGY | Facility: HOSPITAL | Age: 65
Discharge: HOME | End: 2025-08-11
Attending: INTERNAL MEDICINE
Payer: COMMERCIAL

## 2025-08-12 ENCOUNTER — OFFICE VISIT (OUTPATIENT)
Dept: PHYSICAL THERAPY | Facility: CLINIC | Age: 65
End: 2025-08-12
Attending: ORTHOPAEDIC SURGERY
Payer: COMMERCIAL

## 2025-08-21 ENCOUNTER — OFFICE VISIT (OUTPATIENT)
Dept: PHYSICAL THERAPY | Facility: CLINIC | Age: 65
End: 2025-08-21
Attending: ORTHOPAEDIC SURGERY
Payer: COMMERCIAL

## 2025-08-21 DIAGNOSIS — M76.72 PERONEAL TENDONITIS OF LEFT LOWER LEG: Primary | ICD-10-CM

## 2025-08-21 DIAGNOSIS — M76.62 ACHILLES TENDINITIS OF LEFT LOWER EXTREMITY: ICD-10-CM

## 2025-08-21 PROCEDURE — 97110 THERAPEUTIC EXERCISES: CPT

## (undated) DEVICE — DRILL BIT, AO DIA2.6MM X 135MM, SCALED: Brand: VARIAX

## (undated) DEVICE — CAST PADDING 6 IN STERILE

## (undated) DEVICE — GLOVE INDICATOR PI UNDERGLOVE SZ 8 BLUE

## (undated) DEVICE — GLOVE SRG BIOGEL 8

## (undated) DEVICE — SPONGE SCRUB 4 PCT CHLORHEXIDINE

## (undated) DEVICE — CUFF TOURNIQUET 30 X 4 IN QUICK CONNECT DISP 1BLA

## (undated) DEVICE — 3M™ DURAPORE™ SURGICAL TAPE 1538-1, 1 INCH X 10 YARD (2,5CM X 9,1M), 12 ROLLS/BOX: Brand: 3M™ DURAPORE™

## (undated) DEVICE — OCCLUSIVE GAUZE STRIP,3% BISMUTH TRIBROMOPHENATE IN PETROLATUM BLEND: Brand: XEROFORM

## (undated) DEVICE — DRILL BIT

## (undated) DEVICE — INTENDED FOR TISSUE SEPARATION, AND OTHER PROCEDURES THAT REQUIRE A SHARP SURGICAL BLADE TO PUNCTURE OR CUT.: Brand: BARD-PARKER SAFETY BLADES SIZE 15, STERILE

## (undated) DEVICE — CANNULATED DRILL

## (undated) DEVICE — COBAN 4 IN STERILE

## (undated) DEVICE — BANDAGE, ESMARK LF STR 4"X9'(20/CS): Brand: CYPRESS

## (undated) DEVICE — Device

## (undated) DEVICE — DRAPE C-ARM 48 X 84 IN F/ OEC MINIVIEW 6800

## (undated) DEVICE — NEPTUNE E-SEP SMOKE EVACUATION PENCIL, COATED, 70MM BLADE, PUSH BUTTON SWITCH: Brand: NEPTUNE E-SEP

## (undated) DEVICE — INTENDED FOR TISSUE SEPARATION, AND OTHER PROCEDURES THAT REQUIRE A SHARP SURGICAL BLADE TO PUNCTURE OR CUT.: Brand: BARD-PARKER ® CARBON RIB-BACK BLADES

## (undated) DEVICE — SPLINT 5 X 30 IN XFAST SET PLASTER

## (undated) DEVICE — SUT VICRYL 2-0 CT-2 27 IN J269H

## (undated) DEVICE — STEINMANN PIN, SMOOTH
Type: IMPLANTABLE DEVICE | Site: FOOT | Status: NON-FUNCTIONAL
Brand: VARIAX
Removed: 2024-02-19

## (undated) DEVICE — CAST PADDING 6IN UNSTERILE

## (undated) DEVICE — BETHLEHEM UNIV MAJ EXT ,KIT: Brand: CARDINAL HEALTH

## (undated) DEVICE — HOLDING PIN
Type: IMPLANTABLE DEVICE | Site: FOOT | Status: NON-FUNCTIONAL
Brand: ANCHORAGE
Removed: 2024-02-19

## (undated) DEVICE — CHLORAPREP HI-LITE 26ML ORANGE

## (undated) DEVICE — UNTHREADED GUIDE WIRE
Type: IMPLANTABLE DEVICE | Site: FOOT | Status: NON-FUNCTIONAL
Brand: FIXOS
Removed: 2024-02-19

## (undated) DEVICE — SUT ETHILON 3-0 PS-1 18 IN 1663G

## (undated) DEVICE — ACE WRAP 6 IN UNSTERILE

## (undated) DEVICE — DRILL BIT, AO, SCALED: Brand: VARIAX

## (undated) DEVICE — TUBING SUCTION 5MM X 12 FT

## (undated) DEVICE — GLOVE SRG BIOGEL 7.5

## (undated) DEVICE — SUT VICRYL 4-0 PS-2 18 IN J496G

## (undated) DEVICE — ABDOMINAL PAD: Brand: DERMACEA